# Patient Record
Sex: FEMALE | Race: WHITE | NOT HISPANIC OR LATINO | URBAN - METROPOLITAN AREA
[De-identification: names, ages, dates, MRNs, and addresses within clinical notes are randomized per-mention and may not be internally consistent; named-entity substitution may affect disease eponyms.]

---

## 2017-02-02 ENCOUNTER — HOSPITAL ENCOUNTER (EMERGENCY)
Facility: HOSPITAL | Age: 47
Discharge: HOME | End: 2017-02-02
Attending: EMERGENCY MEDICINE | Admitting: EMERGENCY MEDICINE
Payer: COMMERCIAL

## 2017-02-02 VITALS
WEIGHT: 200 LBS | BODY MASS INDEX: 31.39 KG/M2 | OXYGEN SATURATION: 96 % | HEIGHT: 67 IN | TEMPERATURE: 98.3 F | RESPIRATION RATE: 16 BRPM | HEART RATE: 94 BPM | DIASTOLIC BLOOD PRESSURE: 116 MMHG | SYSTOLIC BLOOD PRESSURE: 195 MMHG

## 2017-02-02 DIAGNOSIS — H11.439: Primary | ICD-10-CM

## 2017-02-02 PROCEDURE — 99282 EMERGENCY DEPT VISIT SF MDM: CPT

## 2017-02-02 RX ORDER — LISINOPRIL 10 MG/1
10 TABLET ORAL DAILY
COMMUNITY
End: 2019-10-08 | Stop reason: SDUPTHER

## 2017-02-02 RX ORDER — PROPARACAINE HYDROCHLORIDE 5 MG/ML
1 SOLUTION/ DROPS OPHTHALMIC ONCE
Status: COMPLETED | OUTPATIENT
Start: 2017-02-02 | End: 2017-02-02

## 2017-02-02 RX ADMIN — FLUORESCEIN SODIUM 1 STRIP: 1 STRIP OPHTHALMIC at 17:15

## 2017-02-02 RX ADMIN — PROPARACAINE HYDROCHLORIDE 1 DROP: 5 SOLUTION/ DROPS OPHTHALMIC at 17:15

## 2019-10-08 ENCOUNTER — OFFICE VISIT (OUTPATIENT)
Dept: FAMILY MEDICINE CLINIC | Facility: CLINIC | Age: 49
End: 2019-10-08
Payer: COMMERCIAL

## 2019-10-08 VITALS
OXYGEN SATURATION: 98 % | BODY MASS INDEX: 31.31 KG/M2 | DIASTOLIC BLOOD PRESSURE: 90 MMHG | HEIGHT: 67 IN | HEART RATE: 110 BPM | WEIGHT: 199.5 LBS | TEMPERATURE: 99.2 F | SYSTOLIC BLOOD PRESSURE: 150 MMHG

## 2019-10-08 DIAGNOSIS — R26.89 SHUFFLING GAIT: ICD-10-CM

## 2019-10-08 DIAGNOSIS — F41.9 ANXIETY: ICD-10-CM

## 2019-10-08 DIAGNOSIS — I10 ESSENTIAL HYPERTENSION: ICD-10-CM

## 2019-10-08 DIAGNOSIS — Z12.39 SCREENING FOR BREAST CANCER: ICD-10-CM

## 2019-10-08 DIAGNOSIS — E53.8 FOLIC ACID DEFICIENCY: ICD-10-CM

## 2019-10-08 DIAGNOSIS — R29.898 LEG WEAKNESS, BILATERAL: Primary | ICD-10-CM

## 2019-10-08 DIAGNOSIS — K59.09 CHRONIC CONSTIPATION: ICD-10-CM

## 2019-10-08 DIAGNOSIS — Z23 NEED FOR VACCINATION: ICD-10-CM

## 2019-10-08 PROCEDURE — 90715 TDAP VACCINE 7 YRS/> IM: CPT

## 2019-10-08 PROCEDURE — 99204 OFFICE O/P NEW MOD 45 MIN: CPT | Performed by: FAMILY MEDICINE

## 2019-10-08 PROCEDURE — 3008F BODY MASS INDEX DOCD: CPT | Performed by: FAMILY MEDICINE

## 2019-10-08 PROCEDURE — 90471 IMMUNIZATION ADMIN: CPT

## 2019-10-08 RX ORDER — LISINOPRIL 10 MG/1
10 TABLET ORAL DAILY
Qty: 30 TABLET | Refills: 2 | Status: SHIPPED | OUTPATIENT
Start: 2019-10-08

## 2019-10-08 RX ORDER — HYDROXYZINE HYDROCHLORIDE 25 MG/1
TABLET, FILM COATED ORAL
Qty: 30 TABLET | Refills: 0 | Status: SHIPPED | OUTPATIENT
Start: 2019-10-08 | End: 2019-11-26 | Stop reason: ALTCHOICE

## 2019-10-08 NOTE — PROGRESS NOTES
Subjective:   Chief Complaint   Patient presents with    Anxiety     pt here anxiety, pt states she is having troubl walking that started 2 years ago and has gotten worse, pt states sometimes it is very painfull that she can't even walk, pt states she had 2 MRI's done and they didn't show anything, Dr April Suero through [facility] ordered the MRI        Patient ID: Vandana Teran is a 52 y o  female  Pt here to re-establish as a patient  Complains of leg weakness and shuffling gait  Pt seen by neurologist, Dr Julio Cesar Lara and had Mri of brain - states was normal  Was done at [facility]  Complains of leg weakness for 2 years  - gradually progressive  No studies done of low back  Some low back pain  Complains of anxiety  Tried buspar for anxiety but made anxiety worse  Ssri not helpful  Has not seen psychiatrist or therapist    Last blood work, was 3 months ago  Dr Julio Cesar Lara neurologist - has follow up in November  Has bp elevation  Taking 2 5mg lisinopril 2 tabs daily  Tolerating medication, denies chest pain or shortness of breath  Complains of trouble moving bowels, constipation, tried sons metamucil  No blood, no dark stools  The following portions of the patient's history were reviewed and updated as appropriate: allergies, current medications, past family history, past medical history, past social history, past surgical history and problem list     Review of Systems   Constitutional: Negative  Negative for fatigue and fever  HENT: Negative  Eyes: Negative  Respiratory: Negative  Negative for cough  Cardiovascular: Negative  Gastrointestinal: Negative  Endocrine: Negative  Genitourinary: Negative  Musculoskeletal: Positive for gait problem  Skin: Negative  Allergic/Immunologic: Negative  Neurological: Positive for weakness  Negative for tremors and numbness  Psychiatric/Behavioral: Negative for dysphoric mood  The patient is nervous/anxious  Objective:  Vitals:    10/08/19 1419 10/08/19 1452   BP: (!) 166/108 150/90   Pulse: (!) 110    Temp: 99 2 °F (37 3 °C)    SpO2: 98%    Weight: 90 5 kg (199 lb 8 oz)    Height: 5' 6 5" (1 689 m)       Physical Exam   Constitutional: She is oriented to person, place, and time  She appears well-developed and well-nourished  HENT:   Head: Normocephalic and atraumatic  Cardiovascular: Normal rate, regular rhythm, normal heart sounds and intact distal pulses  Pulmonary/Chest: Effort normal and breath sounds normal    Abdominal: Soft  Bowel sounds are normal    Musculoskeletal: She exhibits no edema, tenderness or deformity  Good strength, bilateral lower extremities  Minimal central pain over lumbar spine  Neurological: She is alert and oriented to person, place, and time  She displays normal reflexes  No sensory deficit  She exhibits normal muscle tone  Coordination abnormal    Cannot walk on heels or toes   Skin: Skin is warm and dry  Psychiatric: She has a normal mood and affect  Her behavior is normal  Judgment and thought content normal    Nursing note and vitals reviewed  Diabetic Foot Exam      Assessment/Plan:    No problem-specific Assessment & Plan notes found for this encounter  Diagnoses and all orders for this visit:    Leg weakness, bilateral  Comments:  recommend mri lumbar spine without contrast r/o spinal stenosis, cauda equina  Orders:  -     MRI lumbar spine wo contrast; Future  -     EMG 2 limb lower extremity;  Future    Shuffling gait  Comments:  continue follow up with Dr Yokasta White, may need physical therapy    Folic acid deficiency  Comments:  continue folic acid supplement daily     Anxiety  Comments:  trial hydroxyzine, consider lamictal, would benefit from psychiatric evaluation  Orders:  -     hydrOXYzine HCL (ATARAX) 25 mg tablet; 1/2-1 tab 3 times a day as needed for anxiety and 1-2 at bedtime    Essential hypertension  Comments:  uncontrolled, increase lisinopril to 10mg daily   Orders:  -     lisinopril (ZESTRIL) 10 mg tablet; Take 1 tablet (10 mg total) by mouth daily    Screening for breast cancer  Comments:  schedule mammogram  Orders:  -     Mammo screening bilateral w 3d & cad; Future    Need for vaccination  Comments: Tdap given today  Orders:  -     TDAP VACCINE GREATER THAN OR EQUAL TO 8YO IM    BMI 31 0-31 9,adult  Comments:  see bmi plan    Chronic constipation  Comments:  trial benefiber, increase fluids, miralax    BMI 31 0-31 9,adult    Other orders  -     FOLIC ACID PO; Take by mouth        BMI Counseling: Body mass index is 31 72 kg/m²  The BMI is above normal  Nutrition recommendations include reducing portion sizes and 3-5 servings of fruits/vegetables daily  Exercise recommendations include exercising 3-5 times per week  BMI Counseling: Body mass index is 31 72 kg/m²  The BMI is above normal  Nutrition recommendations include reducing portion sizes and 3-5 servings of fruits/vegetables daily  Exercise recommendations include exercising 3-5 times per week  BMI Counseling: Body mass index is 31 72 kg/m²  The BMI is above normal  Nutrition recommendations include reducing portion sizes and 3-5 servings of fruits/vegetables daily  Exercise recommendations include exercising 3-5 times per week

## 2019-10-08 NOTE — PATIENT INSTRUCTIONS
Tdap given today  Continue folate supplement  Mri lumbar spine without contrast  Consider emg bilateral lower extremities   benefiber  1 cap in liquid daily= fiber  Then can add miralax 1 cap in liquid daily as needed , safe to take daily if needed  Hydroxyzine for anxiety 1/2-1 tab 3 times a day and 1-2 tabs at bedtime as needed for anxiety  Lisinopril 10mg daily bp 120-130/70-80  Obesity   AMBULATORY CARE:   Obesity  is when your body mass index (BMI) is greater than 30  Your healthcare provider will use your height and weight to measure your BMI  The risks of obesity include  many health problems, such as injuries or physical disability  You may need tests to check for the following:  · Diabetes     · High blood pressure or high cholesterol     · Heart disease     · Gallbladder or liver disease     · Cancer of the colon, breast, prostate, liver, or kidney     · Sleep apnea     · Arthritis or gout  Seek care immediately if:   · You have a severe headache, confusion, or difficulty speaking  · You have weakness on one side of your body  · You have chest pain, sweating, or shortness of breath  Contact your healthcare provider if:   · You have symptoms of gallbladder or liver disease, such as pain in your upper abdomen  · You have knee or hip pain and discomfort while walking  · You have symptoms of diabetes, such as intense hunger and thirst, and frequent urination  · You have symptoms of sleep apnea, such as snoring or daytime sleepiness  · You have questions or concerns about your condition or care  Treatment for obesity  focuses on helping you lose weight to improve your health  Even a small decrease in BMI can reduce the risk for many health problems  Your healthcare provider will help you set a weight-loss goal   · Lifestyle changes  are the first step in treating obesity  These include making healthy food choices and getting regular physical activity   Your healthcare provider may suggest a weight-loss program that involves coaching, education, and therapy  · Medicine  may help you lose weight when it is used with a healthy diet and physical activity  · Surgery  can help you lose weight if you are very obese and have other health problems  There are several types of weight-loss surgery  Ask your healthcare provider for more information  Be successful losing weight:   · Set small, realistic goals  An example of a small goal is to walk for 20 minutes 5 days a week  Anther goal is to lose 5% of your body weight  · Tell friends, family members, and coworkers about your goals  and ask for their support  Ask a friend to lose weight with you, or join a weight-loss support group  · Identify foods or triggers that may cause you to overeat , and find ways to avoid them  Remove tempting high-calorie foods from your home and workplace  Place a bowl of fresh fruit on your kitchen counter  If stress causes you to eat, then find other ways to cope with stress  · Keep a diary to track what you eat and drink  Also write down how many minutes of physical activity you do each day  Weigh yourself once a week and record it in your diary  Eating changes: You will need to eat 500 to 1,000 fewer calories each day than you currently eat to lose 1 to 2 pounds a week  The following changes will help you cut calories:  · Eat smaller portions  Use small plates, no larger than 9 inches in diameter  Fill your plate half full of fruits and vegetables  Measure your food using measuring cups until you know what a serving size looks like  · Eat 3 meals and 1 or 2 snacks each day  Plan your meals in advance  Kay Schmid and eat at home most of the time  Eat slowly  · Eat fruits and vegetables at every meal   They are low in calories and high in fiber, which makes you feel full  Do not add butter, margarine, or cream sauce to vegetables  Use herbs to season steamed vegetables       · Eat less fat and fewer fried foods  Eat more baked or grilled chicken and fish  These protein sources are lower in calories and fat than red meat  Limit fast food  Dress your salads with olive oil and vinegar instead of bottled dressing  · Limit the amount of sugar you eat  Do not drink sugary beverages  Limit alcohol  Activity changes:  Physical activity is good for your body in many ways  It helps you burn calories and build strong muscles  It decreases stress and depression, and improves your mood  It can also help you sleep better  Talk to your healthcare provider before you begin an exercise program   · Exercise for at least 30 minutes 5 days a week  Start slowly  Set aside time each day for physical activity that you enjoy and that is convenient for you  It is best to do both weight training and an activity that increases your heart rate, such as walking, bicycling, or swimming  · Find ways to be more active  Do yard work and housecleaning  Walk up the stairs instead of using elevators  Spend your leisure time going to events that require walking, such as outdoor festivals or fairs  This extra physical activity can help you lose weight and keep it off  Follow up with your healthcare provider as directed: You may need to meet with a dietitian  Write down your questions so you remember to ask them during your visits  © 2017 [address] Information is for End User's use only and may not be sold, redistributed or otherwise used for commercial purposes  All illustrations and images included in CareNotes® are the copyrighted property of [employer]  or Terry Zamudio  The above information is an  only  It is not intended as medical advice for individual conditions or treatments  Talk to your doctor, nurse or pharmacist before following any medical regimen to see if it is safe and effective for you      Obesity   AMBULATORY CARE:   Obesity  is when your body mass index (BMI) is greater than 30  Your healthcare provider will use your height and weight to measure your BMI  The risks of obesity include  many health problems, such as injuries or physical disability  You may need tests to check for the following:  · Diabetes     · High blood pressure or high cholesterol     · Heart disease     · Gallbladder or liver disease     · Cancer of the colon, breast, prostate, liver, or kidney     · Sleep apnea     · Arthritis or gout  Seek care immediately if:   · You have a severe headache, confusion, or difficulty speaking  · You have weakness on one side of your body  · You have chest pain, sweating, or shortness of breath  Contact your healthcare provider if:   · You have symptoms of gallbladder or liver disease, such as pain in your upper abdomen  · You have knee or hip pain and discomfort while walking  · You have symptoms of diabetes, such as intense hunger and thirst, and frequent urination  · You have symptoms of sleep apnea, such as snoring or daytime sleepiness  · You have questions or concerns about your condition or care  Treatment for obesity  focuses on helping you lose weight to improve your health  Even a small decrease in BMI can reduce the risk for many health problems  Your healthcare provider will help you set a weight-loss goal   · Lifestyle changes  are the first step in treating obesity  These include making healthy food choices and getting regular physical activity  Your healthcare provider may suggest a weight-loss program that involves coaching, education, and therapy  · Medicine  may help you lose weight when it is used with a healthy diet and physical activity  · Surgery  can help you lose weight if you are very obese and have other health problems  There are several types of weight-loss surgery  Ask your healthcare provider for more information  Be successful losing weight:   · Set small, realistic goals    An example of a small goal is to walk for 20 minutes 5 days a week  Harish goal is to lose 5% of your body weight  · Tell friends, family members, and coworkers about your goals  and ask for their support  Ask a friend to lose weight with you, or join a weight-loss support group  · Identify foods or triggers that may cause you to overeat , and find ways to avoid them  Remove tempting high-calorie foods from your home and workplace  Place a bowl of fresh fruit on your kitchen counter  If stress causes you to eat, then find other ways to cope with stress  · Keep a diary to track what you eat and drink  Also write down how many minutes of physical activity you do each day  Weigh yourself once a week and record it in your diary  Eating changes: You will need to eat 500 to 1,000 fewer calories each day than you currently eat to lose 1 to 2 pounds a week  The following changes will help you cut calories:  · Eat smaller portions  Use small plates, no larger than 9 inches in diameter  Fill your plate half full of fruits and vegetables  Measure your food using measuring cups until you know what a serving size looks like  · Eat 3 meals and 1 or 2 snacks each day  Plan your meals in advance  [place] and eat at home most of the time  Eat slowly  · Eat fruits and vegetables at every meal   They are low in calories and high in fiber, which makes you feel full  Do not add butter, margarine, or cream sauce to vegetables  Use herbs to season steamed vegetables  · Eat less fat and fewer fried foods  Eat more baked or grilled chicken and fish  These protein sources are lower in calories and fat than red meat  Limit fast food  Dress your salads with olive oil and vinegar instead of bottled dressing  · Limit the amount of sugar you eat  Do not drink sugary beverages  Limit alcohol  Activity changes:  Physical activity is good for your body in many ways  It helps you burn calories and build strong muscles   It decreases stress and depression, and improves your mood  It can also help you sleep better  Talk to your healthcare provider before you begin an exercise program   · Exercise for at least 30 minutes 5 days a week  Start slowly  Set aside time each day for physical activity that you enjoy and that is convenient for you  It is best to do both weight training and an activity that increases your heart rate, such as walking, bicycling, or swimming  · Find ways to be more active  Do yard work and housecleaning  Walk up the stairs instead of using elevators  Spend your leisure time going to events that require walking, such as outdoor festivals or fairs  This extra physical activity can help you lose weight and keep it off  Follow up with your healthcare provider as directed: You may need to meet with a dietitian  Write down your questions so you remember to ask them during your visits  © 2017 [address] Information is for End User's use only and may not be sold, redistributed or otherwise used for commercial purposes  All illustrations and images included in CareNotes® are the copyrighted property of A [employer] M [employer] Inc  or Terry Zamudio  The above information is an  only  It is not intended as medical advice for individual conditions or treatments  Talk to your doctor, nurse or pharmacist before following any medical regimen to see if it is safe and effective for you

## 2019-10-09 ENCOUNTER — TELEPHONE (OUTPATIENT)
Dept: FAMILY MEDICINE CLINIC | Facility: CLINIC | Age: 49
End: 2019-10-09

## 2019-10-09 PROBLEM — K59.09 CHRONIC CONSTIPATION: Status: ACTIVE | Noted: 2019-10-09

## 2019-10-09 PROBLEM — Z23 NEED FOR VACCINATION: Status: ACTIVE | Noted: 2019-10-09

## 2019-10-09 PROBLEM — I10 ESSENTIAL HYPERTENSION: Status: ACTIVE | Noted: 2019-10-09

## 2019-10-09 PROBLEM — E53.8 FOLIC ACID DEFICIENCY: Status: ACTIVE | Noted: 2019-10-09

## 2019-10-09 PROBLEM — F41.9 ANXIETY: Status: ACTIVE | Noted: 2019-10-09

## 2019-10-09 PROBLEM — R26.89 SHUFFLING GAIT: Status: ACTIVE | Noted: 2019-10-09

## 2019-10-09 PROBLEM — Z12.39 SCREENING FOR BREAST CANCER: Status: ACTIVE | Noted: 2019-10-09

## 2019-10-15 ENCOUNTER — APPOINTMENT (EMERGENCY)
Dept: RADIOLOGY | Facility: HOSPITAL | Age: 49
End: 2019-10-15
Payer: COMMERCIAL

## 2019-10-15 ENCOUNTER — HOSPITAL ENCOUNTER (EMERGENCY)
Facility: HOSPITAL | Age: 49
Discharge: HOME | End: 2019-10-15
Attending: EMERGENCY MEDICINE | Admitting: EMERGENCY MEDICINE
Payer: COMMERCIAL

## 2019-10-15 VITALS
TEMPERATURE: 98 F | BODY MASS INDEX: 32.06 KG/M2 | RESPIRATION RATE: 19 BRPM | WEIGHT: 199.52 LBS | HEART RATE: 88 BPM | SYSTOLIC BLOOD PRESSURE: 133 MMHG | OXYGEN SATURATION: 98 % | HEIGHT: 66 IN | DIASTOLIC BLOOD PRESSURE: 76 MMHG

## 2019-10-15 DIAGNOSIS — R07.89 ATYPICAL CHEST PAIN: ICD-10-CM

## 2019-10-15 DIAGNOSIS — F10.10 ALCOHOL ABUSE: ICD-10-CM

## 2019-10-15 DIAGNOSIS — E87.6 ACUTE HYPOKALEMIA: ICD-10-CM

## 2019-10-15 DIAGNOSIS — F10.929 ALCOHOL INTOXICATION (HCC): Primary | ICD-10-CM

## 2019-10-15 LAB
ALBUMIN SERPL BCP-MCNC: 3.3 G/DL (ref 3.5–5)
ALP SERPL-CCNC: 203 U/L (ref 46–116)
ALT SERPL W P-5'-P-CCNC: 89 U/L (ref 12–78)
AMPHETAMINES SERPL QL SCN: NEGATIVE
ANION GAP SERPL CALCULATED.3IONS-SCNC: 16 MMOL/L (ref 4–13)
ANION GAP SERPL CALCULATED.3IONS-SCNC: 20 MMOL/L (ref 4–13)
AST SERPL W P-5'-P-CCNC: 311 U/L (ref 5–45)
BARBITURATES UR QL: NEGATIVE
BASOPHILS # BLD AUTO: 0.05 THOUSANDS/ΜL (ref 0–0.1)
BASOPHILS NFR BLD AUTO: 1 % (ref 0–1)
BENZODIAZ UR QL: NEGATIVE
BILIRUB SERPL-MCNC: 1 MG/DL (ref 0.2–1)
BUN SERPL-MCNC: 10 MG/DL (ref 5–25)
BUN SERPL-MCNC: 8 MG/DL (ref 5–25)
CA-I BLD-SCNC: 0.92 MMOL/L (ref 1.12–1.32)
CALCIUM SERPL-MCNC: 7.4 MG/DL (ref 8.3–10.1)
CALCIUM SERPL-MCNC: 7.6 MG/DL (ref 8.3–10.1)
CHLORIDE SERPL-SCNC: 100 MMOL/L (ref 100–108)
CHLORIDE SERPL-SCNC: 98 MMOL/L (ref 100–108)
CO2 SERPL-SCNC: 24 MMOL/L (ref 21–32)
CO2 SERPL-SCNC: 26 MMOL/L (ref 21–32)
COCAINE UR QL: NEGATIVE
CREAT SERPL-MCNC: 0.55 MG/DL (ref 0.6–1.3)
CREAT SERPL-MCNC: 0.62 MG/DL (ref 0.6–1.3)
EOSINOPHIL # BLD AUTO: 0.03 THOUSAND/ΜL (ref 0–0.61)
EOSINOPHIL NFR BLD AUTO: 1 % (ref 0–6)
ERYTHROCYTE [DISTWIDTH] IN BLOOD BY AUTOMATED COUNT: 14.5 % (ref 11.6–15.1)
ETHANOL SERPL-MCNC: 461 MG/DL (ref 0–3)
GFR SERPL CREATININE-BSD FRML MDRD: 106 ML/MIN/1.73SQ M
GFR SERPL CREATININE-BSD FRML MDRD: 110 ML/MIN/1.73SQ M
GLUCOSE SERPL-MCNC: 126 MG/DL (ref 65–140)
GLUCOSE SERPL-MCNC: 73 MG/DL (ref 65–140)
HCT VFR BLD AUTO: 38.2 % (ref 34.8–46.1)
HGB BLD-MCNC: 12.9 G/DL (ref 11.5–15.4)
IMM GRANULOCYTES # BLD AUTO: 0.06 THOUSAND/UL (ref 0–0.2)
IMM GRANULOCYTES NFR BLD AUTO: 1 % (ref 0–2)
LYMPHOCYTES # BLD AUTO: 1.61 THOUSANDS/ΜL (ref 0.6–4.47)
LYMPHOCYTES NFR BLD AUTO: 31 % (ref 14–44)
MAGNESIUM SERPL-MCNC: 1.9 MG/DL (ref 1.6–2.6)
MCH RBC QN AUTO: 32.7 PG (ref 26.8–34.3)
MCHC RBC AUTO-ENTMCNC: 33.8 G/DL (ref 31.4–37.4)
MCV RBC AUTO: 97 FL (ref 82–98)
METHADONE UR QL: NEGATIVE
MONOCYTES # BLD AUTO: 0.62 THOUSAND/ΜL (ref 0.17–1.22)
MONOCYTES NFR BLD AUTO: 12 % (ref 4–12)
NEUTROPHILS # BLD AUTO: 2.75 THOUSANDS/ΜL (ref 1.85–7.62)
NEUTS SEG NFR BLD AUTO: 54 % (ref 43–75)
NRBC BLD AUTO-RTO: 0 /100 WBCS
OPIATES UR QL SCN: NEGATIVE
PCP UR QL: NEGATIVE
PLATELET # BLD AUTO: 157 THOUSANDS/UL (ref 149–390)
PMV BLD AUTO: 11.1 FL (ref 8.9–12.7)
POTASSIUM SERPL-SCNC: 3 MMOL/L (ref 3.5–5.3)
POTASSIUM SERPL-SCNC: 3.2 MMOL/L (ref 3.5–5.3)
PROT SERPL-MCNC: 6.9 G/DL (ref 6.4–8.2)
RBC # BLD AUTO: 3.95 MILLION/UL (ref 3.81–5.12)
SODIUM SERPL-SCNC: 140 MMOL/L (ref 136–145)
SODIUM SERPL-SCNC: 144 MMOL/L (ref 136–145)
THC UR QL: NEGATIVE
TROPONIN I SERPL-MCNC: <0.02 NG/ML
TROPONIN I SERPL-MCNC: <0.02 NG/ML
WBC # BLD AUTO: 5.12 THOUSAND/UL (ref 4.31–10.16)

## 2019-10-15 PROCEDURE — 99284 EMERGENCY DEPT VISIT MOD MDM: CPT

## 2019-10-15 PROCEDURE — 82330 ASSAY OF CALCIUM: CPT | Performed by: EMERGENCY MEDICINE

## 2019-10-15 PROCEDURE — 80307 DRUG TEST PRSMV CHEM ANLYZR: CPT | Performed by: EMERGENCY MEDICINE

## 2019-10-15 PROCEDURE — 96361 HYDRATE IV INFUSION ADD-ON: CPT

## 2019-10-15 PROCEDURE — 85025 COMPLETE CBC W/AUTO DIFF WBC: CPT | Performed by: EMERGENCY MEDICINE

## 2019-10-15 PROCEDURE — 71045 X-RAY EXAM CHEST 1 VIEW: CPT

## 2019-10-15 PROCEDURE — 36415 COLL VENOUS BLD VENIPUNCTURE: CPT | Performed by: EMERGENCY MEDICINE

## 2019-10-15 PROCEDURE — 80320 DRUG SCREEN QUANTALCOHOLS: CPT | Performed by: EMERGENCY MEDICINE

## 2019-10-15 PROCEDURE — 96366 THER/PROPH/DIAG IV INF ADDON: CPT

## 2019-10-15 PROCEDURE — 93005 ELECTROCARDIOGRAM TRACING: CPT

## 2019-10-15 PROCEDURE — 96365 THER/PROPH/DIAG IV INF INIT: CPT

## 2019-10-15 PROCEDURE — 99285 EMERGENCY DEPT VISIT HI MDM: CPT | Performed by: EMERGENCY MEDICINE

## 2019-10-15 PROCEDURE — 83735 ASSAY OF MAGNESIUM: CPT | Performed by: EMERGENCY MEDICINE

## 2019-10-15 PROCEDURE — 80053 COMPREHEN METABOLIC PANEL: CPT | Performed by: EMERGENCY MEDICINE

## 2019-10-15 PROCEDURE — 84484 ASSAY OF TROPONIN QUANT: CPT | Performed by: EMERGENCY MEDICINE

## 2019-10-15 PROCEDURE — 80048 BASIC METABOLIC PNL TOTAL CA: CPT | Performed by: EMERGENCY MEDICINE

## 2019-10-15 RX ORDER — POTASSIUM CHLORIDE 20 MEQ/1
20 TABLET, EXTENDED RELEASE ORAL ONCE
Status: COMPLETED | OUTPATIENT
Start: 2019-10-15 | End: 2019-10-15

## 2019-10-15 RX ORDER — CALCIUM CARBONATE 200(500)MG
500 TABLET,CHEWABLE ORAL DAILY PRN
Status: DISCONTINUED | OUTPATIENT
Start: 2019-10-15 | End: 2019-10-16 | Stop reason: HOSPADM

## 2019-10-15 RX ORDER — 0.9 % SODIUM CHLORIDE 0.9 %
3 VIAL (ML) INJECTION AS NEEDED
Status: DISCONTINUED | OUTPATIENT
Start: 2019-10-15 | End: 2019-10-16 | Stop reason: HOSPADM

## 2019-10-15 RX ORDER — POTASSIUM CHLORIDE 14.9 MG/ML
20 INJECTION INTRAVENOUS ONCE
Status: COMPLETED | OUTPATIENT
Start: 2019-10-15 | End: 2019-10-15

## 2019-10-15 RX ORDER — FOLIC ACID 1 MG/1
1 TABLET ORAL ONCE
Status: COMPLETED | OUTPATIENT
Start: 2019-10-15 | End: 2019-10-15

## 2019-10-15 RX ORDER — THIAMINE MONONITRATE (VIT B1) 100 MG
100 TABLET ORAL ONCE
Status: COMPLETED | OUTPATIENT
Start: 2019-10-15 | End: 2019-10-15

## 2019-10-15 RX ADMIN — FOLIC ACID 1 MG: 1 TABLET ORAL at 13:47

## 2019-10-15 RX ADMIN — POTASSIUM CHLORIDE 20 MEQ: 1500 TABLET, EXTENDED RELEASE ORAL at 14:39

## 2019-10-15 RX ADMIN — THIAMINE HCL TAB 100 MG 100 MG: 100 TAB at 13:47

## 2019-10-15 RX ADMIN — POTASSIUM CHLORIDE 20 MEQ: 14.9 INJECTION, SOLUTION INTRAVENOUS at 14:39

## 2019-10-15 RX ADMIN — SODIUM CHLORIDE 1000 ML: 0.9 INJECTION, SOLUTION INTRAVENOUS at 13:35

## 2019-10-15 NOTE — ED NOTES
Dr Benitez Rater spoke with [facility] ref referral      Helder Abebe, RN  10/15/19 [phone]

## 2019-10-15 NOTE — ED NOTES
Ambulated to [facility] with walker, slow but steady gait, provider at bedside and witnessed     Ketan Johnson RN  10/15/19 2835

## 2019-10-16 LAB
ATRIAL RATE: 69 BPM
ATRIAL RATE: 75 BPM
ATRIAL RATE: 88 BPM
P AXIS: 65 DEGREES
P AXIS: 68 DEGREES
P AXIS: 80 DEGREES
PR INTERVAL: 136 MS
PR INTERVAL: 136 MS
PR INTERVAL: 144 MS
QRS AXIS: 1 DEGREES
QRS AXIS: 35 DEGREES
QRS AXIS: 38 DEGREES
QRSD INTERVAL: 102 MS
QRSD INTERVAL: 86 MS
QRSD INTERVAL: 92 MS
QT INTERVAL: 400 MS
QT INTERVAL: 438 MS
QT INTERVAL: 486 MS
QTC INTERVAL: 484 MS
QTC INTERVAL: 489 MS
QTC INTERVAL: 520 MS
T WAVE AXIS: 136 DEGREES
T WAVE AXIS: 77 DEGREES
T WAVE AXIS: 83 DEGREES
VENTRICULAR RATE: 69 BPM
VENTRICULAR RATE: 75 BPM
VENTRICULAR RATE: 88 BPM

## 2019-10-16 PROCEDURE — 93010 ELECTROCARDIOGRAM REPORT: CPT | Performed by: INTERNAL MEDICINE

## 2019-10-16 NOTE — ED CARE HANDOFF
Emergency Department Sign Out Note        Sign out and transfer of care from Dr Vini Gutiérrez  See Separate Emergency Department note  The patient, Marj Chester, was evaluated by the previous provider for ETOH intoxication  Workup Completed:  Labs Reviewed   COMPREHENSIVE METABOLIC PANEL - Abnormal       Result Value Ref Range Status    Sodium 144  136 - 145 mmol/L Final    Potassium 3 0 (*) 3 5 - 5 3 mmol/L Final    Chloride 98 (*) 100 - 108 mmol/L Final    CO2 26  21 - 32 mmol/L Final    ANION GAP 20 (*) 4 - 13 mmol/L Final    BUN 10  5 - 25 mg/dL Final    Creatinine 0 62  0 60 - 1 30 mg/dL Final    Comment: Standardized to IDMS reference method    Glucose 73  65 - 140 mg/dL Final    Comment:   If the patient is fasting, the ADA then defines impaired fasting glucose as > 100 mg/dL and diabetes as > or equal to 123 mg/dL  Specimen collection should occur prior to Sulfasalazine administration due to the potential for falsely depressed results  Specimen collection should occur prior to Sulfapyridine administration due to the potential for falsely elevated results  Calcium 7 6 (*) 8 3 - 10 1 mg/dL Final     (*) 5 - 45 U/L Final    Comment:   Specimen collection should occur prior to Sulfasalazine administration due to the potential for falsely depressed results  ALT 89 (*) 12 - 78 U/L Final    Comment:   Specimen collection should occur prior to Sulfasalazine administration due to the potential for falsely depressed results       Alkaline Phosphatase 203 (*) 46 - 116 U/L Final    Total Protein 6 9  6 4 - 8 2 g/dL Final    Albumin 3 3 (*) 3 5 - 5 0 g/dL Final    Total Bilirubin 1 00  0 20 - 1 00 mg/dL Final    eGFR 106  ml/min/1 73sq m Final    Narrative:     [place] guidelines for Chronic Kidney Disease (CKD):     Stage 1 with normal or high GFR (GFR > 90 mL/min/1 73 square meters)    Stage 2 Mild CKD (GFR = 60-89 mL/min/1 73 square meters)    Stage 3A Moderate CKD (GFR = 45-59 mL/min/1 73 square meters)    Stage 3B Moderate CKD (GFR = 30-44 mL/min/1 73 square meters)    Stage 4 Severe CKD (GFR = 15-29 mL/min/1 73 square meters)    Stage 5 End Stage CKD (GFR <15 mL/min/1 73 square meters)  Note: GFR calculation is accurate only with a steady state creatinine   MEDICAL ALCOHOL - Abnormal    Ethanol Lvl 461 (*) 0 - 3 mg/dL Final    Comment: Verified by Repeat Analysis   CALCIUM, IONIZED - Abnormal    Calcium, Ionized 0 92 (*) 1 12 - 1 32 mmol/L Final   BASIC METABOLIC PANEL - Abnormal    Sodium 140  136 - 145 mmol/L Final    Potassium 3 2 (*) 3 5 - 5 3 mmol/L Final    Chloride 100  100 - 108 mmol/L Final    CO2 24  21 - 32 mmol/L Final    ANION GAP 16 (*) 4 - 13 mmol/L Final    BUN 8  5 - 25 mg/dL Final    Creatinine 0 55 (*) 0 60 - 1 30 mg/dL Final    Comment: Standardized to IDMS reference method    Glucose 126  65 - 140 mg/dL Final    Comment:   If the patient is fasting, the ADA then defines impaired fasting glucose as > 100 mg/dL and diabetes as > or equal to 123 mg/dL  Specimen collection should occur prior to Sulfasalazine administration due to the potential for falsely depressed results  Specimen collection should occur prior to Sulfapyridine administration due to the potential for falsely elevated results      Calcium 7 4 (*) 8 3 - 10 1 mg/dL Final    eGFR 110  ml/min/1 73sq m Final    Narrative:     [place] guidelines for Chronic Kidney Disease (CKD):     Stage 1 with normal or high GFR (GFR > 90 mL/min/1 73 square meters)    Stage 2 Mild CKD (GFR = 60-89 mL/min/1 73 square meters)    Stage 3A Moderate CKD (GFR = 45-59 mL/min/1 73 square meters)    Stage 3B Moderate CKD (GFR = 30-44 mL/min/1 73 square meters)    Stage 4 Severe CKD (GFR = 15-29 mL/min/1 73 square meters)    Stage 5 End Stage CKD (GFR <15 mL/min/1 73 square meters)  Note: GFR calculation is accurate only with a steady state creatinine   TROPONIN I - Normal    Troponin I <0 02  <=0 04 ng/mL Final    Comment: 3Autovalidation override  [employer] Chemistry analyzer 99% cutoff is > 0 04 ng/mL in network labs     o cTnI 99% cutoff is useful only when applied to patients in the clinical setting of myocardial ischemia   o cTnI 99% cutoff should be interpreted in the context of clinical history, ECG findings and possibly cardiac imaging to establish correct diagnosis  o cTnI 99% cutoff may be suggestive but clearly not indicative of a coronary event without the clinical setting of myocardial ischemia  MAGNESIUM - Normal    Magnesium 1 9  1 6 - 2 6 mg/dL Final   RAPID DRUG SCREEN, URINE - Normal    Amph/Meth UR Negative  Negative Final    Barbiturate Ur Negative  Negative Final    Benzodiazepine Urine Negative  Negative Final    Cocaine Urine Negative  Negative Final    Methadone Urine Negative  Negative Final    Opiate Urine Negative  Negative Final    PCP Ur Negative  Negative Final    THC Urine Negative  Negative Final    Narrative:     FOR MEDICAL PURPOSES ONLY  IF CONFIRMATION NEEDED PLEASE CONTACT THE LAB WITHIN 5 DAYS  Drug Screen Cutoff Levels:  AMPHETAMINE/METHAMPHETAMINES  1000 ng/mL  BARBITURATES     200 ng/mL  BENZODIAZEPINES     200 ng/mL  COCAINE      300 ng/mL  METHADONE      300 ng/mL  OPIATES      300 ng/mL  PHENCYCLIDINE     25 ng/mL  THC       50 ng/mL     TROPONIN I - Normal    Troponin I <0 02  <=0 04 ng/mL Final    Comment: 3Autovalidation override  S[employer]ens Chemistry analyzer 99% cutoff is > 0 04 ng/mL in network labs     o cTnI 99% cutoff is useful only when applied to patients in the clinical setting of myocardial ischemia   o cTnI 99% cutoff should be interpreted in the context of clinical history, ECG findings and possibly cardiac imaging to establish correct diagnosis  o cTnI 99% cutoff may be suggestive but clearly not indicative of a coronary event without the clinical setting of myocardial ischemia       CBC AND DIFFERENTIAL    WBC 5 12  4 31 - 10 16 Thousand/uL Final    RBC 3 95  3 81 - 5 12 Million/uL Final    Hemoglobin 12 9  11 5 - 15 4 g/dL Final    Hematocrit 38 2  34 8 - 46 1 % Final    MCV 97  82 - 98 fL Final    MCH 32 7  26 8 - 34 3 pg Final    MCHC 33 8  31 4 - 37 4 g/dL Final    RDW 14 5  11 6 - 15 1 % Final    MPV 11 1  8 9 - 12 7 fL Final    Platelets 627  859 - 390 Thousands/uL Final    nRBC 0  /100 WBCs Final    Neutrophils Relative 54  43 - 75 % Final    Immat GRANS % 1  0 - 2 % Final    Lymphocytes Relative 31  14 - 44 % Final    Monocytes Relative 12  4 - 12 % Final    Eosinophils Relative 1  0 - 6 % Final    Basophils Relative 1  0 - 1 % Final    Neutrophils Absolute 2 75  1 85 - 7 62 Thousands/µL Final    Immature Grans Absolute 0 06  0 00 - 0 20 Thousand/uL Final    Lymphocytes Absolute 1 61  0 60 - 4 47 Thousands/µL Final    Monocytes Absolute 0 62  0 17 - 1 22 Thousand/µL Final    Eosinophils Absolute 0 03  0 00 - 0 61 Thousand/µL Final    Basophils Absolute 0 05  0 00 - 0 10 Thousands/µL Final   MEDICAL ALCOHOL      X-ray chest 1 view portable   ED Interpretation   No acute abnl      Final Result      No acute cardiopulmonary disease  Workstation performed: [id number]              ED Course / Workup Pending (followup):  Pt signed out by Dr Coby Yuan  No SI/HI  Awaiting placement for ETOH detox  Medically cleared per Dr Coby Yuan        HEART Risk Score      Most Recent Value   History  0 Filed at: 10/15/2019 1332   ECG  0 Filed at: 10/15/2019 1332   Age  1 Filed at: 10/15/2019 1332   Risk Factors  2 Filed at: 10/15/2019 1332   Troponin  0 Filed at: 10/15/2019 1332   Heart Score Risk Calculator   History  0 Filed at: 10/15/2019 1332   ECG  0 Filed at: 10/15/2019 1332   Age  1 Filed at: 10/15/2019 1332   Risk Factors  2 Filed at: 10/15/2019 1332   Troponin  0 Filed at: 10/15/2019 1332   HEART Score  3 Filed at: 10/15/2019 1332   HEART Score  3 Filed at: 10/15/2019 1332                          ED Course as of Oct 15 2306   Tue Oct 15, 2019 2042 Pt has bed placement  2256 There is an issue with insurance, pt has to f/u with [facility] tmrw  There is a bed available for her        2305 Pt is going home with sister, who will stay with her  Understands no driving due to intoxication  Procedures  MDM  Number of Diagnoses or Management Options  Acute hypokalemia: new and requires workup  Alcohol abuse: established and worsening  Alcohol intoxication ([facility] [address] ): established and worsening  Atypical chest pain: new and requires workup     Amount and/or Complexity of Data Reviewed  Clinical lab tests: reviewed  Tests in the radiology section of CPT®: reviewed  Tests in the medicine section of CPT®: reviewed  Discuss the patient with other providers: yes    Risk of Complications, Morbidity, and/or Mortality  Presenting problems: moderate  Diagnostic procedures: low  Management options: low    Patient Progress  Patient progress: stable      Disposition  Final diagnoses:   Alcohol intoxication ([facility] [address] )   Alcohol abuse   Acute hypokalemia   Atypical chest pain     Time reflects when diagnosis was documented in both MDM as applicable and the Disposition within this note     Time User Action Codes Description Comment    10/15/2019  7:28 PM Prashant Quick Add [F10 929] Alcohol intoxication ([facility] [address] )     10/15/2019  7:28 PM Prashant Quick Add [F10 10] Alcohol abuse     10/15/2019  7:28 PM Prashant Quick Add [E87 6] Acute hypokalemia     10/15/2019  7:28 PM Prashant Quick Add [R07 89] Atypical chest pain       ED Disposition     ED Disposition Condition Date/Time Comment    Discharge Stable Tue Oct 15, 2019 10:56 PM Ninoska Spear discharge to home/self care              Follow-up Information     Follow up With Specialties Details Why Contact Info Additional Information    Lizette Aguirre DO Family Medicine  As needed [place] [phone]       [facility] Emergency Department Emergency Medicine  If symptoms worsen [address] [facility]  [address] [facility] [address], [place] [address], [place], [place], [address]    [address]  [facility] [address]  [phone]    Go tomorrow morning  You have a bed available there for alcohol detox  Patient's Medications   Discharge Prescriptions    No medications on file     No discharge procedures on file         ED Provider  Electronically Signed by     Rudi Connelly MD  10/15/19 4385

## 2019-10-16 NOTE — ED NOTES
Zeenat was instructed to no sit on the edge of gurney as it is uneven and could flip  Patient and sister verbalize understanding and patient is still sitting on edge of gurney        Shar Santillan RN  10/15/19 2004

## 2019-11-26 ENCOUNTER — OFFICE VISIT (OUTPATIENT)
Dept: FAMILY MEDICINE CLINIC | Facility: CLINIC | Age: 49
End: 2019-11-26
Payer: COMMERCIAL

## 2019-11-26 VITALS
BODY MASS INDEX: 29.55 KG/M2 | TEMPERATURE: 99.4 F | DIASTOLIC BLOOD PRESSURE: 100 MMHG | SYSTOLIC BLOOD PRESSURE: 140 MMHG | OXYGEN SATURATION: 97 % | HEART RATE: 113 BPM | HEIGHT: 67 IN | WEIGHT: 188.25 LBS

## 2019-11-26 DIAGNOSIS — M62.838 MUSCLE SPASM: ICD-10-CM

## 2019-11-26 DIAGNOSIS — G62.89 OTHER POLYNEUROPATHY: ICD-10-CM

## 2019-11-26 DIAGNOSIS — R00.0 TACHYCARDIA: ICD-10-CM

## 2019-11-26 DIAGNOSIS — G62.1 ALCOHOL-INDUCED POLYNEUROPATHY (HCC): ICD-10-CM

## 2019-11-26 DIAGNOSIS — F10.11 ALCOHOL ABUSE, IN REMISSION: ICD-10-CM

## 2019-11-26 DIAGNOSIS — I10 ESSENTIAL HYPERTENSION: Primary | ICD-10-CM

## 2019-11-26 DIAGNOSIS — F51.01 PRIMARY INSOMNIA: ICD-10-CM

## 2019-11-26 DIAGNOSIS — F39 MOOD DISORDER (HCC): ICD-10-CM

## 2019-11-26 PROBLEM — G62.9 PERIPHERAL NEUROPATHY: Status: ACTIVE | Noted: 2019-11-26

## 2019-11-26 PROCEDURE — 99214 OFFICE O/P EST MOD 30 MIN: CPT | Performed by: FAMILY MEDICINE

## 2019-11-26 RX ORDER — PROPRANOLOL HYDROCHLORIDE 10 MG/1
10 TABLET ORAL 2 TIMES DAILY
Qty: 60 TABLET | Refills: 2
Start: 2019-11-26

## 2019-11-26 RX ORDER — AMITRIPTYLINE HYDROCHLORIDE 25 MG/1
25 TABLET, FILM COATED ORAL
Qty: 30 TABLET | Refills: 2
Start: 2019-11-26

## 2019-11-26 RX ORDER — METHOCARBAMOL 500 MG/1
500 TABLET, FILM COATED ORAL 3 TIMES DAILY
Qty: 90 TABLET | Refills: 0
Start: 2019-11-26

## 2019-11-26 RX ORDER — PROPRANOLOL HYDROCHLORIDE 10 MG/1
TABLET ORAL
COMMUNITY
Start: 2019-11-22 | End: 2019-11-26 | Stop reason: SDUPTHER

## 2019-11-26 RX ORDER — AMITRIPTYLINE HYDROCHLORIDE 25 MG/1
TABLET, FILM COATED ORAL
COMMUNITY
Start: 2019-10-29 | End: 2019-11-26 | Stop reason: SDUPTHER

## 2019-11-26 RX ORDER — METHOCARBAMOL 500 MG/1
TABLET, FILM COATED ORAL
COMMUNITY
Start: 2019-10-27 | End: 2019-11-26 | Stop reason: SDUPTHER

## 2019-11-26 RX ORDER — LAMOTRIGINE 25 MG/1
25 TABLET ORAL DAILY
Qty: 30 TABLET | Refills: 1 | Status: SHIPPED | OUTPATIENT
Start: 2019-11-26

## 2019-11-26 NOTE — PATIENT INSTRUCTIONS
Restart propanolol 1 tab twice a day  Then start lamictal 25mg 1 tab daily - anxiety   Then Increase amytriptyline to 50mg at bedtime      Fax blood work to office - follow up  Liver function, potassium and sugar  Hypertension   AMBULATORY CARE:   Hypertension  is high blood pressure (BP)  Your BP is the force of your blood moving against the walls of your arteries  Normal BP is less than 120/80  Prehypertension is between 120/80 and 139/89  Hypertension is 140/90 or higher  Hypertension causes your BP to get so high that your heart has to work much harder than normal  This can damage your heart  You can control hypertension with a healthy lifestyle or medicines  A controlled blood pressure helps protect your organs, such as your heart, lungs, brain, and kidneys  Common symptoms include the following:   · Headache     · Blurred vision     · Chest pain     · Dizziness or weakness     · Trouble breathing    · Nosebleeds  Call 911 for any of the following:   · You have discomfort in your chest that feels like squeezing, pressure, fullness, or pain  · You become confused or have difficulty speaking  · You suddenly feel lightheaded or have trouble breathing  · You have pain or discomfort in your back, neck, jaw, stomach, or arm  Seek care immediately if:   · You have a severe headache or vision loss  · You have weakness in an arm or leg  Contact your healthcare provider if:   · You feel faint, dizzy, confused, or drowsy  · You have been taking your BP medicine and your BP is still higher than your healthcare provider says it should be  · You have questions or concerns about your condition or care  Treatment for hypertension  may include medicine to lower your BP and lower your cholesterol level  A low cholesterol level helps prevent heart disease and makes it easier to control your blood pressure  You may also need to make lifestyle changes  Take your medicine exactly as directed     Manage hypertension:  Talk with your healthcare provider about these and other ways to manage hypertension:  · Check your BP at home  Sit and rest for 5 minutes before you take your BP  Extend your arm and support it on a flat surface  Your arm should be at the same level as your heart  Follow the directions that came with your BP monitor  If possible, take at least 2 BP readings each time  Take your BP at least twice a day at the same times each day, such as morning and evening  Keep a record of your BP readings and bring it to your follow-up visits  Ask your healthcare provider what your BP should be  · Limit sodium (salt) as directed  Too much sodium can affect your fluid balance  Check labels to find low-sodium or no-salt-added foods  Some low-sodium foods use potassium salts for flavor  Too much potassium can also cause health problems  Your healthcare provider will tell you how much sodium and potassium are safe for you to have in a day  He or she may recommend that you limit sodium to 2,300 mg a day  · Follow the meal plan recommended by your healthcare provider  A dietitian or your provider can give you more information on low-sodium plans or the DASH (Dietary Approaches to Stop Hypertension) eating plan  The DASH plan is low in sodium, unhealthy fats, and total fat  It is high in potassium, calcium, and fiber  · Exercise to maintain a healthy weight  Exercise at least 30 minutes per day, on most days of the week  This will help decrease your blood pressure  Ask your healthcare provider about the best exercise plan for you  · Decrease stress  This may help lower your BP  Learn ways to relax, such as deep breathing or listening to music  · Limit alcohol  Women should limit alcohol to 1 drink a day  Men should limit alcohol to 2 drinks a day  A drink of alcohol is 12 ounces of beer, 5 ounces of wine, or 1½ ounces of liquor  · Do not smoke    Nicotine and other chemicals in cigarettes and cigars can increase your BP and also cause lung damage  Ask your healthcare provider for information if you currently smoke and need help to quit  E-cigarettes or smokeless tobacco still contain nicotine  Talk to your healthcare provider before you use these products  · Manage any other health conditions you have  Health conditions such as diabetes can increase your risk for hypertension  Follow your healthcare provider's instructions and take all your medicines as directed  Follow up with your healthcare provider as directed: You will need to return to have your BP checked and to have other lab tests done  Write down your questions so you remember to ask them during your visits  © 2017 [employer] Information is for End User's use only and may not be sold, redistributed or otherwise used for commercial purposes  All illustrations and images included in CareNotes® are the copyrighted property of [employer]  or Terry Zamudio  The above information is an  only  It is not intended as medical advice for individual conditions or treatments  Talk to your doctor, nurse or pharmacist before following any medical regimen to see if it is safe and effective for you

## 2019-11-26 NOTE — PROGRESS NOTES
Assessment/Plan:      Diagnoses and all orders for this visit:    Essential hypertension  Comments:  Uncontrolled, continue lisinopril and restart propanolol twice a day 10 mg, monitor blood pressure with home blood pressure cuff  Orders:  -     propranolol (INDERAL) 10 mg tablet; Take 1 tablet (10 mg total) by mouth 2 (two) times a day    Tachycardia  Comments:  Tachycardic, restart propanolol 10 mg twice a day    Alcohol abuse, in remission  Comments: Will need psychiatry evaluation    Alcohol-induced polyneuropathy ([facility] [address] )  Comments:  Improved since stopping alcohol, schedule EMG    Mood disorder ([facility])  Comments:  Anxiety primarily, Atarax not helpful, BuSpar worsen symptoms, SSRIs not helpful  Patient will start Lamictal 25 mg 1 tablet daily and will increase gradually   Orders:  -     lamoTRIgine (LaMICtal) 25 mg tablet; Take 1 tablet (25 mg total) by mouth daily    Muscle spasm  Comments:  Robaxin 4 times a day as needed for leg cramps  Orders:  -     methocarbamol (ROBAXIN) 500 mg tablet; Take 1 tablet (500 mg total) by mouth 3 (three) times a day    Primary insomnia  Comments:  Patient can fall asleep but not stay asleep  Increase amitriptyline to 50 mg at bedtime  Orders:  -     amitriptyline (ELAVIL) 25 mg tablet; Take 1 tablet (25 mg total) by mouth daily at bedtime    Other polyneuropathy  Comments:  Likely secondary to alcohol, schedule EMG  Orders:  -     EMG 2 limb lower extremity; Future    Other orders  -     Discontinue: methocarbamol (ROBAXIN) 500 mg tablet  -     Discontinue: amitriptyline (ELAVIL) 25 mg tablet  -     Discontinue: propranolol (INDERAL) 10 mg tablet          Subjective:  Chief Complaint   Patient presents with    Follow-up     Pt here today because she would like to go over medications with you  She was in rehab and was put on several different medications  CMP/CBC complete at hospital  Annual exam due  Patient ID: Augustina Singletary is a 52 y o  female      Patient is here with her son today  pt   has been in rehab at [facility] in [place]- 37 days, for alcohol  States she has not had a drink  Was prescribed medications upon discharge  Propanolol 10 mg twice a day, lisinopril 10 mg, Robaxin 4 times a day and amitriptyline 25 mg at bedtime  Started on propanolol for blood pressure and pulse  All she was in rehab, she was given medications that made her dizzy  She eliminated some medications to see if the dizziness would improve  1 of the medications she stopped was propanolol  She had also been on Topamax and also Ativan short-term  She denies any chest pain, shortness of breath or headaches  She had blood work repeated in rehab after having elevated liver enzymes and low potassium in the hospital   Also her sugar was elevated  Patient states upon recheck the labs had improved, liver enzymes were normal and potassium was improved but not to normal   She will have the labs faxed to us  Review of Systems   Constitutional: Positive for fatigue  Negative for fever  HENT: Negative  Eyes: Negative  Respiratory: Negative  Negative for cough  Cardiovascular: Negative  Gastrointestinal: Negative  Endocrine: Negative  Genitourinary: Negative  Musculoskeletal: Negative  Skin: Negative  Allergic/Immunologic: Negative  Neurological: Positive for numbness  Psychiatric/Behavioral: Positive for sleep disturbance  The patient is nervous/anxious  The following portions of the patient's history were reviewed and updated as appropriate: allergies, current medications, past family history, past medical history, past social history, past surgical history and problem list     Objective:  Vitals:    11/26/19 1220 11/26/19 1300   BP: (!) 146/101 140/100   Pulse: (!) 113    Temp: 99 4 °F (37 4 °C)    SpO2: 97%    Weight: 85 4 kg (188 lb 4 oz)    Height: 5' 6 5" (1 689 m)       Physical Exam   Constitutional: She is oriented to person, place, and time  She appears well-developed and well-nourished  HENT:   Head: Normocephalic and atraumatic  Right Ear: External ear normal    Left Ear: External ear normal    Nose: Nose normal    Mouth/Throat: Oropharynx is clear and moist    Cardiovascular: Normal rate, regular rhythm, normal heart sounds and intact distal pulses  Pulmonary/Chest: Effort normal and breath sounds normal  She has no wheezes  She has no rales  Abdominal: Soft  Bowel sounds are normal    Musculoskeletal: She exhibits no edema, tenderness or deformity  Neurological: She is alert and oriented to person, place, and time  Skin: Skin is warm and dry  Psychiatric: Her behavior is normal  Judgment and thought content normal    Patient appears anxious   Nursing note and vitals reviewed

## 2019-12-05 ENCOUNTER — TELEPHONE (OUTPATIENT)
Dept: FAMILY MEDICINE CLINIC | Facility: CLINIC | Age: 49
End: 2019-12-05

## 2020-10-31 ENCOUNTER — HOSPITAL ENCOUNTER (EMERGENCY)
Facility: HOSPITAL | Age: 50
Discharge: HOME | End: 2020-11-01
Attending: EMERGENCY MEDICINE | Admitting: EMERGENCY MEDICINE

## 2020-10-31 VITALS
OXYGEN SATURATION: 97 % | RESPIRATION RATE: 18 BRPM | TEMPERATURE: 98 F | DIASTOLIC BLOOD PRESSURE: 89 MMHG | BODY MASS INDEX: 29.41 KG/M2 | WEIGHT: 185 LBS | HEART RATE: 92 BPM | SYSTOLIC BLOOD PRESSURE: 150 MMHG

## 2020-10-31 DIAGNOSIS — R45.851 SUICIDAL IDEATIONS: Primary | ICD-10-CM

## 2020-10-31 DIAGNOSIS — IMO0002 SELF-INFLICTED INJURY: ICD-10-CM

## 2020-10-31 PROCEDURE — 99285 EMERGENCY DEPT VISIT HI MDM: CPT | Performed by: EMERGENCY MEDICINE

## 2020-10-31 PROCEDURE — 81002 URINALYSIS NONAUTO W/O SCOPE: CPT | Performed by: EMERGENCY MEDICINE

## 2020-10-31 PROCEDURE — 99284 EMERGENCY DEPT VISIT MOD MDM: CPT

## 2020-10-31 PROCEDURE — 81025 URINE PREGNANCY TEST: CPT | Performed by: EMERGENCY MEDICINE

## 2020-10-31 PROCEDURE — 82075 ASSAY OF BREATH ETHANOL: CPT | Performed by: EMERGENCY MEDICINE

## 2020-11-01 LAB
ALBUMIN SERPL BCP-MCNC: 3.9 G/DL (ref 3.5–5)
ALP SERPL-CCNC: 90 U/L (ref 46–116)
ALT SERPL W P-5'-P-CCNC: 19 U/L (ref 12–78)
AMPHETAMINES SERPL QL SCN: NEGATIVE
ANION GAP SERPL CALCULATED.3IONS-SCNC: 12 MMOL/L (ref 4–13)
AST SERPL W P-5'-P-CCNC: 18 U/L (ref 5–45)
BARBITURATES UR QL: NEGATIVE
BASOPHILS # BLD AUTO: 0.05 THOUSANDS/ΜL (ref 0–0.1)
BASOPHILS NFR BLD AUTO: 1 % (ref 0–1)
BENZODIAZ UR QL: NEGATIVE
BILIRUB SERPL-MCNC: 0.1 MG/DL (ref 0.2–1)
BUN SERPL-MCNC: 8 MG/DL (ref 5–25)
CALCIUM SERPL-MCNC: 9 MG/DL (ref 8.3–10.1)
CHLORIDE SERPL-SCNC: 110 MMOL/L (ref 100–108)
CLARITY, POC: NORMAL
CO2 SERPL-SCNC: 27 MMOL/L (ref 21–32)
COCAINE UR QL: NEGATIVE
COLOR, POC: YELLOW
CREAT SERPL-MCNC: 0.61 MG/DL (ref 0.6–1.3)
EOSINOPHIL # BLD AUTO: 0.11 THOUSAND/ΜL (ref 0–0.61)
EOSINOPHIL NFR BLD AUTO: 1 % (ref 0–6)
ERYTHROCYTE [DISTWIDTH] IN BLOOD BY AUTOMATED COUNT: 15.2 % (ref 11.6–15.1)
ETHANOL EXG-MCNC: 0.01 MG/DL
ETHANOL EXG-MCNC: 0.27 MG/DL
EXT BILIRUBIN, UA: NORMAL
EXT BLOOD URINE: NORMAL
EXT GLUCOSE, UA: NORMAL
EXT KETONES: NORMAL
EXT NITRITE, UA: NORMAL
EXT PH, UA: 6
EXT PREG TEST URINE: NEGATIVE
EXT PROTEIN, UA: NORMAL
EXT SPECIFIC GRAVITY, UA: 1.03
EXT UROBILINOGEN: NORMAL
EXT. CONTROL ED NAV: NORMAL
GFR SERPL CREATININE-BSD FRML MDRD: 106 ML/MIN/1.73SQ M
GLUCOSE SERPL-MCNC: 87 MG/DL (ref 65–140)
HCT VFR BLD AUTO: 43.7 % (ref 34.8–46.1)
HGB BLD-MCNC: 14.6 G/DL (ref 11.5–15.4)
IMM GRANULOCYTES # BLD AUTO: 0.09 THOUSAND/UL (ref 0–0.2)
IMM GRANULOCYTES NFR BLD AUTO: 1 % (ref 0–2)
LYMPHOCYTES # BLD AUTO: 3.87 THOUSANDS/ΜL (ref 0.6–4.47)
LYMPHOCYTES NFR BLD AUTO: 41 % (ref 14–44)
MCH RBC QN AUTO: 32.4 PG (ref 26.8–34.3)
MCHC RBC AUTO-ENTMCNC: 33.4 G/DL (ref 31.4–37.4)
MCV RBC AUTO: 97 FL (ref 82–98)
METHADONE UR QL: NEGATIVE
MONOCYTES # BLD AUTO: 0.84 THOUSAND/ΜL (ref 0.17–1.22)
MONOCYTES NFR BLD AUTO: 9 % (ref 4–12)
NEUTROPHILS # BLD AUTO: 4.4 THOUSANDS/ΜL (ref 1.85–7.62)
NEUTS SEG NFR BLD AUTO: 47 % (ref 43–75)
NRBC BLD AUTO-RTO: 0 /100 WBCS
OPIATES UR QL SCN: NEGATIVE
OXYCODONE+OXYMORPHONE UR QL SCN: NEGATIVE
PCP UR QL: NEGATIVE
PLATELET # BLD AUTO: 360 THOUSANDS/UL (ref 149–390)
PMV BLD AUTO: 10.1 FL (ref 8.9–12.7)
POTASSIUM SERPL-SCNC: 3.9 MMOL/L (ref 3.5–5.3)
PROT SERPL-MCNC: 8.2 G/DL (ref 6.4–8.2)
RBC # BLD AUTO: 4.51 MILLION/UL (ref 3.81–5.12)
SODIUM SERPL-SCNC: 149 MMOL/L (ref 136–145)
THC UR QL: POSITIVE
TSH SERPL DL<=0.05 MIU/L-ACNC: 2.1 UIU/ML (ref 0.36–3.74)
WBC # BLD AUTO: 9.36 THOUSAND/UL (ref 4.31–10.16)
WBC # BLD EST: NORMAL 10*3/UL

## 2020-11-01 PROCEDURE — 82075 ASSAY OF BREATH ETHANOL: CPT | Performed by: EMERGENCY MEDICINE

## 2020-11-01 PROCEDURE — 80053 COMPREHEN METABOLIC PANEL: CPT | Performed by: EMERGENCY MEDICINE

## 2020-11-01 PROCEDURE — 36415 COLL VENOUS BLD VENIPUNCTURE: CPT | Performed by: EMERGENCY MEDICINE

## 2020-11-01 PROCEDURE — 85025 COMPLETE CBC W/AUTO DIFF WBC: CPT | Performed by: EMERGENCY MEDICINE

## 2020-11-01 PROCEDURE — 80307 DRUG TEST PRSMV CHEM ANLYZR: CPT | Performed by: EMERGENCY MEDICINE

## 2020-11-01 PROCEDURE — 84443 ASSAY THYROID STIM HORMONE: CPT | Performed by: EMERGENCY MEDICINE

## 2021-08-08 ENCOUNTER — APPOINTMENT (EMERGENCY)
Dept: RADIOLOGY | Facility: HOSPITAL | Age: 51
DRG: 184 | End: 2021-08-08

## 2021-08-08 ENCOUNTER — HOSPITAL ENCOUNTER (INPATIENT)
Facility: HOSPITAL | Age: 51
LOS: 18 days | Discharge: HOME | DRG: 184 | End: 2021-08-26
Attending: STUDENT IN AN ORGANIZED HEALTH CARE EDUCATION/TRAINING PROGRAM | Admitting: SURGERY
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
## 2021-08-08 DIAGNOSIS — S22.41XA CLOSED FRACTURE OF MULTIPLE RIBS OF RIGHT SIDE, INITIAL ENCOUNTER: ICD-10-CM

## 2021-08-08 DIAGNOSIS — S42.001A CLOSED NONDISPLACED FRACTURE OF RIGHT CLAVICLE, UNSPECIFIED PART OF CLAVICLE, INITIAL ENCOUNTER: ICD-10-CM
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
## 2021-08-08 DIAGNOSIS — F41.9 ANXIETY: Primary | ICD-10-CM

## 2021-08-08 LAB
ANION GAP SERPL CALCULATED.3IONS-SCNC: 5 MMOL/L (ref 4–13)
APAP SERPL-MCNC: <2 UG/ML (ref 10–20)
BASE EX.OXY STD BLDV CALC-SCNC: 85.4 % (ref 60–80)
BASE EXCESS BLDA CALC-SCNC: 1 MMOL/L (ref -2–3)
BASE EXCESS BLDV CALC-SCNC: -6.3 MMOL/L
BASOPHILS # BLD AUTO: 0.05 THOUSANDS/ΜL (ref 0–0.1)
BASOPHILS NFR BLD AUTO: 1 % (ref 0–1)
BUN SERPL-MCNC: 9 MG/DL (ref 5–25)
CALCIUM SERPL-MCNC: 8.3 MG/DL (ref 8.3–10.1)
CHLORIDE SERPL-SCNC: 113 MMOL/L (ref 100–108)
CO2 SERPL-SCNC: 28 MMOL/L (ref 21–32)
CREAT SERPL-MCNC: 0.47 MG/DL (ref 0.6–1.3)
EOSINOPHIL # BLD AUTO: 0.07 THOUSAND/ΜL (ref 0–0.61)
EOSINOPHIL NFR BLD AUTO: 1 % (ref 0–6)
ERYTHROCYTE [DISTWIDTH] IN BLOOD BY AUTOMATED COUNT: 18.3 % (ref 11.6–15.1)
ETHANOL SERPL-MCNC: 376 MG/DL (ref 0–3)
GFR SERPL CREATININE-BSD FRML MDRD: 115 ML/MIN/1.73SQ M
GLUCOSE SERPL-MCNC: 96 MG/DL (ref 65–140)
GLUCOSE SERPL-MCNC: 97 MG/DL (ref 65–140)
HCO3 BLDA-SCNC: 23.2 MMOL/L (ref 24–30)
HCO3 BLDV-SCNC: 17.7 MMOL/L (ref 24–30)
HCT VFR BLD AUTO: 40.9 % (ref 34.8–46.1)
HCT VFR BLD CALC: 39 % (ref 34.8–46.1)
HGB BLD-MCNC: 14 G/DL (ref 11.5–15.4)
HGB BLDA-MCNC: 13.3 G/DL (ref 11.5–15.4)
IMM GRANULOCYTES # BLD AUTO: 0.08 THOUSAND/UL (ref 0–0.2)
IMM GRANULOCYTES NFR BLD AUTO: 1 % (ref 0–2)
INR PPP: 0.87 (ref 0.84–1.19)
LYMPHOCYTES # BLD AUTO: 2.48 THOUSANDS/ΜL (ref 0.6–4.47)
LYMPHOCYTES NFR BLD AUTO: 34 % (ref 14–44)
MCH RBC QN AUTO: 32.6 PG (ref 26.8–34.3)
MCHC RBC AUTO-ENTMCNC: 34.2 G/DL (ref 31.4–37.4)
MCV RBC AUTO: 95 FL (ref 82–98)
MONOCYTES # BLD AUTO: 0.54 THOUSAND/ΜL (ref 0.17–1.22)
MONOCYTES NFR BLD AUTO: 8 % (ref 4–12)
NEUTROPHILS # BLD AUTO: 3.99 THOUSANDS/ΜL (ref 1.85–7.62)
NEUTS SEG NFR BLD AUTO: 55 % (ref 43–75)
NRBC BLD AUTO-RTO: 0 /100 WBCS
O2 CT BLDV-SCNC: 15.3 ML/DL
PCO2 BLD: 24 MMOL/L (ref 21–32)
PCO2 BLD: 28.4 MM HG (ref 42–50)
PCO2 BLDV: 30.9 MM HG (ref 42–50)
PH BLD: 7.52 [PH] (ref 7.3–7.4)
PH BLDV: 7.38 [PH] (ref 7.3–7.4)
PLATELET # BLD AUTO: 301 THOUSANDS/UL (ref 149–390)
PMV BLD AUTO: 10 FL (ref 8.9–12.7)
PO2 BLD: 37 MM HG (ref 35–45)
PO2 BLDV: 63.8 MM HG (ref 35–45)
POTASSIUM BLD-SCNC: 7.5 MMOL/L (ref 3.5–5.3)
POTASSIUM SERPL-SCNC: 3.8 MMOL/L (ref 3.5–5.3)
PROTHROMBIN TIME: 11.9 SECONDS (ref 11.6–14.5)
RBC # BLD AUTO: 4.29 MILLION/UL (ref 3.81–5.12)
SALICYLATES SERPL-MCNC: 4 MG/DL (ref 3–20)
SAO2 % BLD FROM PO2: 79 % (ref 60–85)
SODIUM BLD-SCNC: 141 MMOL/L (ref 136–145)
SODIUM SERPL-SCNC: 146 MMOL/L (ref 136–145)
SPECIMEN SOURCE: ABNORMAL
WBC # BLD AUTO: 7.21 THOUSAND/UL (ref 4.31–10.16)

## 2021-08-08 PROCEDURE — 85610 PROTHROMBIN TIME: CPT | Performed by: EMERGENCY MEDICINE

## 2021-08-08 PROCEDURE — 74177 CT ABD & PELVIS W/CONTRAST: CPT

## 2021-08-08 PROCEDURE — 80179 DRUG ASSAY SALICYLATE: CPT | Performed by: EMERGENCY MEDICINE

## 2021-08-08 PROCEDURE — 99223 1ST HOSP IP/OBS HIGH 75: CPT | Performed by: SURGERY

## 2021-08-08 PROCEDURE — 82947 ASSAY GLUCOSE BLOOD QUANT: CPT

## 2021-08-08 PROCEDURE — 36415 COLL VENOUS BLD VENIPUNCTURE: CPT | Performed by: EMERGENCY MEDICINE

## 2021-08-08 PROCEDURE — 82805 BLOOD GASES W/O2 SATURATION: CPT | Performed by: EMERGENCY MEDICINE

## 2021-08-08 PROCEDURE — 93005 ELECTROCARDIOGRAM TRACING: CPT

## 2021-08-08 PROCEDURE — 82077 ASSAY SPEC XCP UR&BREATH IA: CPT | Performed by: EMERGENCY MEDICINE

## 2021-08-08 PROCEDURE — 70450 CT HEAD/BRAIN W/O DYE: CPT

## 2021-08-08 PROCEDURE — 72125 CT NECK SPINE W/O DYE: CPT

## 2021-08-08 PROCEDURE — 84295 ASSAY OF SERUM SODIUM: CPT

## 2021-08-08 PROCEDURE — 80048 BASIC METABOLIC PNL TOTAL CA: CPT | Performed by: EMERGENCY MEDICINE

## 2021-08-08 PROCEDURE — 85025 COMPLETE CBC W/AUTO DIFF WBC: CPT | Performed by: EMERGENCY MEDICINE

## 2021-08-08 PROCEDURE — 71260 CT THORAX DX C+: CPT

## 2021-08-08 PROCEDURE — 99285 EMERGENCY DEPT VISIT HI MDM: CPT

## 2021-08-08 PROCEDURE — 85014 HEMATOCRIT: CPT

## 2021-08-08 PROCEDURE — NC001 PR NO CHARGE: Performed by: EMERGENCY MEDICINE

## 2021-08-08 PROCEDURE — 82803 BLOOD GASES ANY COMBINATION: CPT

## 2021-08-08 PROCEDURE — 84132 ASSAY OF SERUM POTASSIUM: CPT

## 2021-08-08 PROCEDURE — 80143 DRUG ASSAY ACETAMINOPHEN: CPT | Performed by: EMERGENCY MEDICINE

## 2021-08-08 PROCEDURE — 2W38XYZ IMMOBILIZATION OF RIGHT UPPER EXTREMITY USING OTHER DEVICE: ICD-10-PCS | Performed by: SURGERY

## 2021-08-08 RX ORDER — HYDROMORPHONE HCL/PF 1 MG/ML
0.5 SYRINGE (ML) INJECTION
Status: DISCONTINUED | OUTPATIENT
Start: 2021-08-08 | End: 2021-08-16

## 2021-08-08 RX ORDER — GABAPENTIN 300 MG/1
300 CAPSULE ORAL
Status: DISCONTINUED | OUTPATIENT
Start: 2021-08-09 | End: 2021-08-26 | Stop reason: HOSPADM

## 2021-08-08 RX ORDER — OXYCODONE HYDROCHLORIDE 10 MG/1
10 TABLET ORAL EVERY 4 HOURS PRN
Status: DISCONTINUED | OUTPATIENT
Start: 2021-08-08 | End: 2021-08-26 | Stop reason: HOSPADM

## 2021-08-08 RX ORDER — SENNOSIDES 8.6 MG
2 TABLET ORAL DAILY
Status: DISCONTINUED | OUTPATIENT
Start: 2021-08-09 | End: 2021-08-26 | Stop reason: HOSPADM

## 2021-08-08 RX ORDER — FOLIC ACID 1 MG/1
1 TABLET ORAL DAILY
Status: DISCONTINUED | OUTPATIENT
Start: 2021-08-09 | End: 2021-08-26 | Stop reason: HOSPADM

## 2021-08-08 RX ORDER — ACETAMINOPHEN 325 MG/1
975 TABLET ORAL EVERY 8 HOURS SCHEDULED
Status: DISCONTINUED | OUTPATIENT
Start: 2021-08-08 | End: 2021-08-26 | Stop reason: HOSPADM

## 2021-08-08 RX ORDER — OXYCODONE HYDROCHLORIDE 5 MG/1
5 TABLET ORAL EVERY 4 HOURS PRN
Status: DISCONTINUED | OUTPATIENT
Start: 2021-08-08 | End: 2021-08-26 | Stop reason: HOSPADM

## 2021-08-08 RX ORDER — ONDANSETRON 2 MG/ML
4 INJECTION INTRAMUSCULAR; INTRAVENOUS EVERY 6 HOURS PRN
Status: DISCONTINUED | OUTPATIENT
Start: 2021-08-08 | End: 2021-08-19

## 2021-08-08 RX ORDER — LANOLIN ALCOHOL/MO/W.PET/CERES
100 CREAM (GRAM) TOPICAL DAILY
Status: DISCONTINUED | OUTPATIENT
Start: 2021-08-09 | End: 2021-08-26 | Stop reason: HOSPADM

## 2021-08-08 RX ORDER — METHOCARBAMOL 500 MG/1
500 TABLET, FILM COATED ORAL EVERY 6 HOURS SCHEDULED
Status: DISCONTINUED | OUTPATIENT
Start: 2021-08-09 | End: 2021-08-12

## 2021-08-08 RX ADMIN — IOHEXOL 100 ML: 350 INJECTION, SOLUTION INTRAVENOUS at 19:32

## 2021-08-08 NOTE — ED PROVIDER NOTES
Emergency Department Airway Evaluation and Management Form    History  Obtained from:  EMSPatient has no allergy information on record  No chief complaint on file  Patient is a 51-year-old female status post down fall flight of stairs positive ETOH on board patient is acutely altered  Trauma alert requested by EMS  No past medical history on file  No past surgical history on file  No family history on file  Social History     Tobacco Use    Smoking status: Not on file   Substance Use Topics    Alcohol use: Not on file    Drug use: Not on file     I have reviewed and agree with the history as documented      Review of Systems    Physical Exam  /79   Pulse 83   Temp 97 6 °F (36 4 °C) (Rectal)   Resp 20   Wt 79 6 kg (175 lb 7 8 oz)   SpO2 100% Comment: 2L    Physical Exam  HENT:      Mouth/Throat:      Comments: Airway open and patent        ED Medications  Medications   iohexol (OMNIPAQUE) 350 MG/ML injection (MULTI-DOSE) 100 mL (has no administration in time range)       Intubation  Procedures    Notes  No acute airway intervention indicated    Final Diagnosis  Final diagnoses:   None       ED Provider  Electronically Signed by     Garcia Vargas MD  08/08/21 3005

## 2021-08-09 ENCOUNTER — APPOINTMENT (INPATIENT)
Dept: RADIOLOGY | Facility: HOSPITAL | Age: 51
DRG: 184 | End: 2021-08-09

## 2021-08-09 PROBLEM — S42.001A RIGHT CLAVICLE FRACTURE: Status: ACTIVE | Noted: 2021-08-09

## 2021-08-09 PROBLEM — F10.929 ALCOHOL INTOXICATION (HCC): Status: ACTIVE | Noted: 2021-08-09

## 2021-08-09 PROBLEM — S22.41XA FRACTURE OF MULTIPLE RIBS OF RIGHT SIDE: Status: ACTIVE | Noted: 2021-08-09

## 2021-08-09 PROBLEM — K63.9 COLON ABNORMALITY: Status: ACTIVE | Noted: 2021-08-09

## 2021-08-09 LAB
ANION GAP SERPL CALCULATED.3IONS-SCNC: 11 MMOL/L (ref 4–13)
ATRIAL RATE: 84 BPM
BUN SERPL-MCNC: 9 MG/DL (ref 5–25)
CALCIUM SERPL-MCNC: 7.7 MG/DL (ref 8.3–10.1)
CHLORIDE SERPL-SCNC: 107 MMOL/L (ref 100–108)
CO2 SERPL-SCNC: 23 MMOL/L (ref 21–32)
CREAT SERPL-MCNC: 0.34 MG/DL (ref 0.6–1.3)
ERYTHROCYTE [DISTWIDTH] IN BLOOD BY AUTOMATED COUNT: 17.6 % (ref 11.6–15.1)
GFR SERPL CREATININE-BSD FRML MDRD: 128 ML/MIN/1.73SQ M
GLUCOSE SERPL-MCNC: 85 MG/DL (ref 65–140)
HCT VFR BLD AUTO: 37.4 % (ref 34.8–46.1)
HGB BLD-MCNC: 12.6 G/DL (ref 11.5–15.4)
MAGNESIUM SERPL-MCNC: 1.9 MG/DL (ref 1.6–2.6)
MCH RBC QN AUTO: 31.7 PG (ref 26.8–34.3)
MCHC RBC AUTO-ENTMCNC: 33.7 G/DL (ref 31.4–37.4)
MCV RBC AUTO: 94 FL (ref 82–98)
P AXIS: 56 DEGREES
PLATELET # BLD AUTO: 250 THOUSANDS/UL (ref 149–390)
PLATELET # BLD AUTO: 271 THOUSANDS/UL (ref 149–390)
PMV BLD AUTO: 10.5 FL (ref 8.9–12.7)
PMV BLD AUTO: 9.8 FL (ref 8.9–12.7)
POTASSIUM SERPL-SCNC: 3.7 MMOL/L (ref 3.5–5.3)
PR INTERVAL: 130 MS
QRS AXIS: 1 DEGREES
QRSD INTERVAL: 92 MS
QT INTERVAL: 436 MS
QTC INTERVAL: 515 MS
RBC # BLD AUTO: 3.97 MILLION/UL (ref 3.81–5.12)
SODIUM SERPL-SCNC: 141 MMOL/L (ref 136–145)
T WAVE AXIS: 65 DEGREES
VENTRICULAR RATE: 84 BPM
WBC # BLD AUTO: 8.2 THOUSAND/UL (ref 4.31–10.16)

## 2021-08-09 PROCEDURE — 99232 SBSQ HOSP IP/OBS MODERATE 35: CPT | Performed by: EMERGENCY MEDICINE

## 2021-08-09 PROCEDURE — 71046 X-RAY EXAM CHEST 2 VIEWS: CPT

## 2021-08-09 PROCEDURE — 93010 ELECTROCARDIOGRAM REPORT: CPT | Performed by: INTERNAL MEDICINE

## 2021-08-09 PROCEDURE — NC001 PR NO CHARGE: Performed by: ORTHOPAEDIC SURGERY

## 2021-08-09 PROCEDURE — 99254 IP/OBS CNSLTJ NEW/EST MOD 60: CPT | Performed by: ANESTHESIOLOGY

## 2021-08-09 PROCEDURE — 36415 COLL VENOUS BLD VENIPUNCTURE: CPT | Performed by: EMERGENCY MEDICINE

## 2021-08-09 PROCEDURE — 73000 X-RAY EXAM OF COLLAR BONE: CPT

## 2021-08-09 PROCEDURE — 85027 COMPLETE CBC AUTOMATED: CPT | Performed by: EMERGENCY MEDICINE

## 2021-08-09 PROCEDURE — 83735 ASSAY OF MAGNESIUM: CPT | Performed by: EMERGENCY MEDICINE

## 2021-08-09 PROCEDURE — 80048 BASIC METABOLIC PNL TOTAL CA: CPT | Performed by: EMERGENCY MEDICINE

## 2021-08-09 PROCEDURE — 85049 AUTOMATED PLATELET COUNT: CPT | Performed by: EMERGENCY MEDICINE

## 2021-08-09 RX ORDER — LISINOPRIL 10 MG/1
10 TABLET ORAL DAILY
COMMUNITY

## 2021-08-09 RX ORDER — LORAZEPAM 1 MG/1
2 TABLET ORAL ONCE
Status: COMPLETED | OUTPATIENT
Start: 2021-08-09 | End: 2021-08-09

## 2021-08-09 RX ADMIN — OXYCODONE HYDROCHLORIDE 5 MG: 5 TABLET ORAL at 01:05

## 2021-08-09 RX ADMIN — OXYCODONE HYDROCHLORIDE 10 MG: 10 TABLET ORAL at 17:01

## 2021-08-09 RX ADMIN — ACETAMINOPHEN 975 MG: 325 TABLET, FILM COATED ORAL at 13:00

## 2021-08-09 RX ADMIN — OXYCODONE HYDROCHLORIDE 10 MG: 10 TABLET ORAL at 21:05

## 2021-08-09 RX ADMIN — GABAPENTIN 300 MG: 300 CAPSULE ORAL at 21:04

## 2021-08-09 RX ADMIN — THIAMINE HCL TAB 100 MG 100 MG: 100 TAB at 08:24

## 2021-08-09 RX ADMIN — FOLIC ACID 1 MG: 1 TABLET ORAL at 08:24

## 2021-08-09 RX ADMIN — OXYCODONE HYDROCHLORIDE 10 MG: 10 TABLET ORAL at 05:25

## 2021-08-09 RX ADMIN — METHOCARBAMOL 500 MG: 500 TABLET, FILM COATED ORAL at 05:25

## 2021-08-09 RX ADMIN — METHOCARBAMOL 500 MG: 500 TABLET, FILM COATED ORAL at 11:35

## 2021-08-09 RX ADMIN — METHOCARBAMOL 500 MG: 500 TABLET, FILM COATED ORAL at 23:14

## 2021-08-09 RX ADMIN — METHOCARBAMOL 500 MG: 500 TABLET, FILM COATED ORAL at 17:01

## 2021-08-09 RX ADMIN — METHOCARBAMOL 500 MG: 500 TABLET, FILM COATED ORAL at 01:05

## 2021-08-09 RX ADMIN — LORAZEPAM 2 MG: 1 TABLET ORAL at 17:19

## 2021-08-09 RX ADMIN — ENOXAPARIN SODIUM 30 MG: 30 INJECTION SUBCUTANEOUS at 21:04

## 2021-08-09 RX ADMIN — ACETAMINOPHEN 975 MG: 325 TABLET, FILM COATED ORAL at 21:04

## 2021-08-09 RX ADMIN — ACETAMINOPHEN 975 MG: 325 TABLET, FILM COATED ORAL at 01:06

## 2021-08-09 RX ADMIN — HYDROMORPHONE HYDROCHLORIDE 0.5 MG: 1 INJECTION, SOLUTION INTRAMUSCULAR; INTRAVENOUS; SUBCUTANEOUS at 12:54

## 2021-08-09 RX ADMIN — OXYCODONE HYDROCHLORIDE 10 MG: 10 TABLET ORAL at 10:49

## 2021-08-09 RX ADMIN — HYDROMORPHONE HYDROCHLORIDE 0.5 MG: 1 INJECTION, SOLUTION INTRAMUSCULAR; INTRAVENOUS; SUBCUTANEOUS at 08:20

## 2021-08-09 RX ADMIN — ACETAMINOPHEN 975 MG: 325 TABLET, FILM COATED ORAL at 05:25

## 2021-08-09 NOTE — ED PROCEDURE NOTE
Procedure  POC FAST US    Date/Time: 8/8/2021 10:38 PM  Performed by: Dominique Vaughan MD  Authorized by: Dominique Vaughan MD     Patient location:  Trauma  Other Assisting Provider: Yes (comment) Yang Correia)    Procedure details:     Exam Type:  Diagnostic    Indications: blunt abdominal trauma      Assess for:  Intra-abdominal fluid and pericardial effusion    Technique: FAST      Image quality: diagnostic      Image availability:  Images available in PACS  FAST Findings:     RUQ (Hepatorenal) free fluid: absent      LUQ (Splenorenal) free fluid: absent      Suprapubic free fluid: absent      Cardiac wall motion: identified      Pericardial effusion: absent    Interpretation:     Impressions: negative    Comments:      Neg fast                     Dominique Vaughan MD  08/08/21 4590
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
## 2021-08-09 NOTE — PROGRESS NOTES
[facility]  Progress Note - Satish Quinteros 1970, 46 y o  female MRN: [id number]  Unit/Bed#: [address] [facility] [address] 622-01 Encounter: [id number]  Primary Care Provider: Jose Luis Rodriguez DO   Date and time admitted to hospital: 8/8/2021  7:15 PM    Colon abnormality  Assessment & Plan  -found to have lobular wall thickening of the sigmoid colon, colitis versus mass  -will likely need colonoscopy referral to rule out cancer  -unable to inform patient about incidental findings at this time due to gross alcohol intoxication    Right clavicle fracture  Assessment & Plan  -placed in sling    Alcohol intoxication ([facility] [address] )  Assessment & Plan  -patient will be admitted to step-down to given her acute alcohol intoxication with waxing and waning GCS with multiple rib fractures  -thymine and folate, CIWA protocol  -patient endorsing suicidal thoughts today per EMS, will need to re-evaluate when patient is sober    * Fracture of multiple ribs of right side  Assessment & Plan  -right 2 through 7 rib fractures, questionable flail segment at 2 through 5  -initially mildly hypoxic in the Trauma [place] to the low 90s, currently saturating well on room air  -APS consulted  -pain control, multimodal pain regimen   -no hemo or pneumothorax but does have posttraumatic atelectasis in the anterior lateral right upper lobe  -will obtain a follow-up chest x-ray tomorrow        Disposition:  Continue inpatient care      SUBJECTIVE:  Chief Complaint: "I don't remember what happened"    Subjective:  Patient is complaining this morning of right-sided clavicle pain where she does have a fracture, currently in a sling  Patient was found last night to have multiple rib fractures on the right side  Also found to have incidental finding on CT abdomen of her colon    Patient alert grossly intoxicated this morning    OBJECTIVE:     Meds/Allergies     Current Facility-Administered Medications:     acetaminophen (TYLENOL) tablet 317 mg, 975 mg, Oral, Q8H [facility], Jm Glover MD, 975 mg at 08/09/21 1300    enoxaparin (LOVENOX) subcutaneous injection 30 mg, 30 mg, Subcutaneous, Q12H, Rubia Glover MD    folic acid (FOLVITE) tablet 1 mg, 1 mg, Oral, Daily, Jm Glover MD, 1 mg at 08/09/21 0824    gabapentin (NEURONTIN) capsule 300 mg, 300 mg, Oral, HS, Jm Glover MD    HYDROmorphone (DILAUDID) injection 0 5 mg, 0 5 mg, Intravenous, Q3H PRN, Madeline Corcoran MD, 0 5 mg at 08/09/21 1254    methocarbamol (ROBAXIN) tablet 500 mg, 500 mg, Oral, Q6H [facility], Jm Glover MD, 500 mg at 08/09/21 1135    ondansetron (ZOFRAN) injection 4 mg, 4 mg, Intravenous, Q6H PRN, Madeline Corcoran MD    oxyCODONE (ROXICODONE) immediate release tablet 10 mg, 10 mg, Oral, Q4H PRN, Jm Glover MD, 10 mg at 08/09/21 1049    oxyCODONE (ROXICODONE) IR tablet 5 mg, 5 mg, Oral, Q4H PRN, Jm Glover MD, 5 mg at 08/09/21 0105    senna (SENOKOT) tablet 17 2 mg, 2 tablet, Oral, Daily, Jm Glover MD    thiamine tablet 100 mg, 100 mg, Oral, Daily, Jm Glover MD, 100 mg at 08/09/21 0824     Vitals:   Vitals:    08/09/21 1223   BP: 145/93   Pulse: 89   Resp: 17   Temp: 98 °F (36 7 °C)   SpO2: 93%       Intake/Output:  I/O     None           Nutrition/GI Proph/Bowel Reg:  Regular house  Senokot  Physical Exam:   GENERAL APPEARANCE:  Obvious signs of head trauma  NEURO:  Awake and alert, oriented x3   5/5 strength in the upper and lower extremities  Normal sensation in the upper lower extremities  HEENT:  Pupils equal and reactive  Right-sided forehead/lateral eyebrow hematoma  CV:  Regular rate and rhythm, no murmurs  LUNGS:  CTAB  GI:  Soft, nondistended, nontender  :  Deferred  MSK:  Tenderness to palpation of the right clavicle  Right upper extremity in sling  Tenderness of the right chest wall as well  SKIN:  Warm and dry      Invasive Devices     Peripheral Intravenous Line            Peripheral IV 08/08/21 Left Hand 1 day    Peripheral IV 08/08/21 Right Wrist 1 day                 Lab Results: Results: I have personally reviewed pertinent reports  Imaging/EKG Studies: Results: I have personally reviewed pertinent reports  VTE Prophylaxis: Enoxaparin (Lovenox)           Progress Note - Tertiary Trauma [place]   Ninoska Spear 46 y o  female MRN: [id number]  [facility] Encounter: [id number]    Summary of Diagnosed Injuries:   -right 2 through 7 rib fractures  -posttraumatic atelectasis in the anterior lateral right upper lobe  -right clavicular fracture    Clinical Plan:   -APS consult for multiple right-sided rib fractures  -chest x-ray this morning without any significant pulmonary contusion, no pneumothorax   -rib fracture protocol   -multimodal analgesia  -ortho consult for right clavicle fracture, orthopedics consulted  -PT/OT consulted  -monitor for alcohol withdrawal       Prophylaxis: Enoxaparin (Lovenox)    Disposition:  Continue inpatient care    Code status:  Level 1 - Full Code    Consultants:   APS, PT/OT, orthopedics    Is the patient 72 years or older?: No        SUBJECTIVE:       Tertiary Exam Due on: 8/9/21    Mechanism of Injury:Fall    Details related to Injury: +LOC:  yes    Chief Complaint:  "My right side hurts"    HPI/Last 24 hour events:  Patient was admitted last night as a will be trauma, found to have right-sided rib and clavicle fracture  Remained stable  At presentation, patient was intoxicated but this morning is clinically sober  Remains on room air  Denies any lightheadedness or dizziness, headaches, neck pain      Active medications:           Current Facility-Administered Medications:     acetaminophen (TYLENOL) tablet 975 mg, 975 mg, Oral, Q8H JEFF, 975 mg at 08/09/21 1300    enoxaparin (LOVENOX) subcutaneous injection 30 mg, 30 mg, Subcutaneous, N17C    folic acid (FOLVITE) tablet 1 mg, 1 mg, Oral, Daily, 1 mg at 08/09/21 0824    gabapentin (NEURONTIN) capsule 300 mg, 300 mg, Oral, HS   HYDROmorphone (DILAUDID) injection 0 5 mg, 0 5 mg, Intravenous, Q3H PRN, 0 5 mg at 08/09/21 1254    methocarbamol (ROBAXIN) tablet 500 mg, 500 mg, Oral, Q6H JEFF, 500 mg at 08/09/21 1135    ondansetron (ZOFRAN) injection 4 mg, 4 mg, Intravenous, Q6H PRN    oxyCODONE (ROXICODONE) immediate release tablet 10 mg, 10 mg, Oral, Q4H PRN, 10 mg at 08/09/21 1049    oxyCODONE (ROXICODONE) IR tablet 5 mg, 5 mg, Oral, Q4H PRN, 5 mg at 08/09/21 0105    senna (SENOKOT) tablet 17 2 mg, 2 tablet, Oral, Daily    thiamine tablet 100 mg, 100 mg, Oral, Daily, 100 mg at 08/09/21 0824      OBJECTIVE:     Vitals:   Vitals:    08/09/21 1223   BP: 145/93   Pulse: 89   Resp: 17   Temp: 98 °F (36 7 °C)   SpO2: 93%       Physical Exam:   GENERAL APPEARANCE:  Sitting up in bed, awake  NEURO:  Alert and oriented x3  Normal strength in the upper and lower extremities although right shoulder strength is limited by pain  Normal sensation in the upper and lower extremities  HEENT:  Pupils are equal and reactive  Facial trauma to the right side of the face with cephalhematoma the right forehead  CV:  Regular rate and rhythm  LUNGS:  CTAB  GI:  Soft, nontender  :  Deferred  MSK:  No deformities  Right upper extremity in a sling  SKIN:  Warm and dry  I/O:   I/O     None          Invasive Devices: Invasive Devices     Peripheral Intravenous Line            Peripheral IV 08/08/21 Left Hand 1 day    Peripheral IV 08/08/21 Right Wrist 1 day                  Imaging:   TRAUMA - CT head wo contrast    Result Date: 8/8/2021  Impression: Right periorbital hematoma without displaced fractures, evidence for orbital soft tissue abnormalities, intracranial hemorrhage, or other acute intracranial findings   I personally discussed this study with Dr Monica Troy on 8/8/2021 at 8:08 PM  Workstation performed: [id number]     TRAUMA - CT spine cervical wo contrast    Result Date: 8/8/2021  Impression: No cervical spine fracture or traumatic malalignment  I personally discussed this study with Dr Nona Gibbs on 8/8/2021 at 8:08 PM   Workstation performed: [id number]     TRAUMA - CT chest abdomen pelvis w contrast    Result Date: 8/8/2021  Impression: 1  Acute right second through seventh rib fractures without significant displacement  Question flail segment second through fifth ribs  2   Posttraumatic atelectasis in the anterolateral right upper lobe without cuca contusion or pulmonary laceration  No pneumothorax or hemothorax  3   Nondisplaced acute right clavicular fracture without acromioclavicular separation or sternoclavicular dissociation  4   Skeletal or visceral injuries demonstrated in the abdomen or pelvis  5   Lobular wall thickening of the sigmoid colon  Though appearance could be seen with colitis or diverticulitis, lobulated contour is somewhat suspicious for mass  Recommend correlation for history suggestive of colitis  Recommend further evaluation with colonoscopy  6   Right ovarian dermoid cyst   I personally discussed this study with Dr Nona Gibbs on 8/8/2021 at 8:08 PM  Workstation performed: [id number]     XR Trauma multiple ([facility] trauma bay ONLY)    Result Date: 8/8/2021  Impression: Known right second through seventh acute rib fractures are better demonstrated on recent CT due to nondisplacement  No pneumothorax, hemothorax, or pulmonary contusion demonstrated   Workstation performed: [id number]       Labs:   CBC:   Lab Results   Component Value Date    WBC 8 20 08/09/2021    HGB 12 6 08/09/2021    HCT 37 4 08/09/2021    MCV 94 08/09/2021     08/09/2021    MCH 31 7 08/09/2021    MCHC 33 7 08/09/2021    RDW 17 6 (H) 08/09/2021    MPV 9 8 08/09/2021    NRBC 0 08/08/2021     CMP:   Lab Results   Component Value Date     08/09/2021    CO2 23 08/09/2021    CO2 24 08/08/2021    BUN 9 08/09/2021    CREATININE 0 34 (L) 08/09/2021    GLUCOSE 97 08/08/2021    CALCIUM 7 7 (L) 08/09/2021    EGFR 128 08/09/2021

## 2021-08-09 NOTE — PLAN OF CARE
Problem: Potential for Falls  Goal: Patient will remain free of falls  Description: INTERVENTIONS:  - Educate patient/family on patient safety including physical limitations  - Instruct patient to call for assistance with activity   - Consult OT/PT to assist with strengthening/mobility   - Keep Call bell within reach  - Keep bed low and locked with side rails adjusted as appropriate  - Keep care items and personal belongings within reach  - Initiate and maintain comfort rounds  - Make Fall Risk Sign visible to staff  - Apply yellow socks and bracelet for high fall risk patients  - Consider moving patient to room near nurses station  8/9/2021 1838 by Darell Salinas RN  Outcome: Progressing  8/9/2021 1837 by Darell Salinas RN  Outcome: Progressing     Problem: DISCHARGE PLANNING - CARE MANAGEMENT  Goal: Discharge to post-acute care or home with appropriate resources  Description: INTERVENTIONS:  - Conduct assessment to determine patient/family and health care team treatment goals, and need for post-acute services based on payer coverage, community resources, and patient preferences, and barriers to discharge  - Address psychosocial, clinical, and financial barriers to discharge as identified in assessment in conjunction with the patient/family and health care team  - Arrange appropriate level of post-acute services according to patients   needs and preference and payer coverage in collaboration with the physician and health care team  - Communicate with and update the patient/family, physician, and health care team regarding progress on the discharge plan  - Arrange appropriate transportation to post-acute venues  8/9/2021 1838 by Darell Salinas RN  Outcome: Progressing  8/9/2021 1837 by Darell Salinas RN  Outcome: Progressing     Problem: PAIN - ADULT  Goal: Verbalizes/displays adequate comfort level or baseline comfort level  Description: Interventions:  - Encourage patient to monitor pain and request assistance  - Assess pain using appropriate pain scale  - Administer analgesics based on type and severity of pain and evaluate response  - Implement non-pharmacological measures as appropriate and evaluate response  - Consider cultural and social influences on pain and pain management  - Notify physician/advanced practitioner if interventions unsuccessful or patient reports new pain  Outcome: Progressing     Problem: Knowledge Deficit  Goal: Patient/family/caregiver demonstrates understanding of disease process, treatment plan, medications, and discharge instructions  Description: Complete learning assessment and assess knowledge base    Interventions:  - Provide teaching at level of understanding  - Provide teaching via preferred learning methods  Outcome: Progressing     Problem: SKIN/TISSUE INTEGRITY - ADULT  Goal: Incision(s), wounds(s) or drain site(s) healing without S/S of infection  Description: INTERVENTIONS  - Assess and document dressing, incision, wound bed, drain sites and surrounding tissue  - Provide patient and family education  Outcome: Progressing     Problem: MUSCULOSKELETAL - ADULT  Goal: Maintain or return mobility to safest level of function  Description: INTERVENTIONS:  - Assess patient's ability to carry out ADLs; assess patient's baseline for ADL function and identify physical deficits which impact ability to perform ADLs (bathing, care of mouth/teeth, toileting, grooming, dressing, etc )  - Assess/evaluate cause of self-care deficits   - Assess range of motion  - Assess patient's mobility  - Assess patient's need for assistive devices and provide as appropriate  - Encourage maximum independence but intervene and supervise when necessary  - Involve family in performance of ADLs  - Assess for home care needs following discharge   - Consider OT consult to assist with ADL evaluation and planning for discharge  - Provide patient education as appropriate  Outcome: Progressing

## 2021-08-09 NOTE — ASSESSMENT & PLAN NOTE
-right 2 through 7 rib fractures, questionable flail segment at 2 through 5  -initially mildly hypoxic in the Trauma [place] to the low 90s, currently saturating well on room air  -APS consulted  -pain control, multimodal pain regimen   -no hemo or pneumothorax but does have posttraumatic atelectasis in the anterior lateral right upper lobe  -will obtain a follow-up chest x-ray tomorrow

## 2021-08-09 NOTE — ASSESSMENT & PLAN NOTE
-patient will be admitted to step-down to given her acute alcohol intoxication with waxing and waning GCS with multiple rib fractures  -thymine and folate, CIWA protocol  -patient endorsing suicidal thoughts today per EMS, will need to re-evaluate when patient is sober

## 2021-08-09 NOTE — CONSULTS
Consultation- Orthopedics   Ninoska Spear 46 y o  female MRN: [id number]  Unit/Bed#: X ray      Chief Complaint:   right shoulder pain    HPI:   46 y  o female status post fall while intoxicated complaining of right shoulder pain  The police heard her fall down approximately 12 steps yesterday after they knocked on her front door for a wellness check  Positive head strike with unknown LOC  She presented as a level B trauma and was found to have right sided 2nd -7th rib fractures and right periorbital ecchymosis  She denies numbness or tingling to the right upper extremity  PMH significant for HTN  She is right hand dominate and enjoys sewing as a hobby  Smokes 1 PPD of cigarettes and dinks a pint of vodka on a regular basis  Review Of Systems:   · Skin: Normal  · Neuro: See HPI  · Musculoskeletal: See HPI  · 14 point review of systems negative except as stated above     Past Medical History:   No past medical history on file  Past Surgical History:   No past surgical history on file  Family History:  Family history reviewed and non-contributory  No family history on file  Social History:  Social History     Socioeconomic History    Marital status: /Civil Union     Spouse name: Not on file    Number of children: Not on file    Years of education: Not on file    Highest education level: Not on file   Occupational History    Not on file   Tobacco Use    Smoking status: Not on file   Substance and Sexual Activity    Alcohol use: Not on file    Drug use: Not on file    Sexual activity: Not on file   Other Topics Concern    Not on file   Social History Narrative    Not on file     Social Determinants of Health     Financial Resource Strain:     Difficulty of Paying Living Expenses:    Food Insecurity:     Worried About Running Out of Food in the Last Year:     [address] in the Last Year:    Transportation Needs:     Lack of Transportation (Medical):      Lack of Transportation (Non-Medical):    Physical Activity:     Days of Exercise per Week:     Minutes of Exercise per Session:    Stress:     Feeling of Stress :    Social Connections:     Frequency of Communication with Friends and Family:     Frequency of Social Gatherings with Friends and Family:     Attends Sabianist Services:     Active Member of Clubs or Organizations:     Attends Club or Organization Meetings:     Marital Status:    Intimate Partner Violence:     Fear of Current or Ex-Partner:     Emotionally Abused:     Physically Abused:     Sexually Abused:         Allergies:   Not on File        Labs:  0   Lab Value Date/Time    HCT 37 4 08/09/2021 0534    HCT 40 9 08/08/2021 1922    HCT 39 08/08/2021 1922    HGB 12 6 08/09/2021 0534    HGB 14 0 08/08/2021 1922    HGB 13 3 08/08/2021 1922    INR 0 87 08/08/2021 1922    WBC 8 20 08/09/2021 0534    WBC 7 21 08/08/2021 1922       Meds:    Current Facility-Administered Medications:     acetaminophen (TYLENOL) tablet 975 mg, 975 mg, Oral, Q8H [address], Opal Glover MD, 975 mg at 08/09/21 0525    enoxaparin (LOVENOX) subcutaneous injection 30 mg, 30 mg, Subcutaneous, Q12H, Rubia Glover MD    folic acid (FOLVITE) tablet 1 mg, 1 mg, Oral, Daily, Opal Glover MD, 1 mg at 08/09/21 0824    gabapentin (NEURONTIN) capsule 300 mg, 300 mg, Oral, HS, Opal Glover MD    HYDROmorphone (DILAUDID) injection 0 5 mg, 0 5 mg, Intravenous, Q3H PRN, Guadalupe Dunn MD, 0 5 mg at 08/09/21 0820    methocarbamol (ROBAXIN) tablet 500 mg, 500 mg, Oral, Q6H [address], Guadalupe Dunn MD, 500 mg at 08/09/21 0525    ondansetron (ZOFRAN) injection 4 mg, 4 mg, Intravenous, Q6H PRN, Opal Glover MD    oxyCODONE (ROXICODONE) immediate release tablet 10 mg, 10 mg, Oral, Q4H PRN, Opal Glover MD, 10 mg at 08/09/21 0525    oxyCODONE (ROXICODONE) IR tablet 5 mg, 5 mg, Oral, Q4H PRN, Opal Glover MD, 5 mg at 08/09/21 0105    senna (SENOKOT) tablet 17 2 mg, 2 tablet, Oral, Daily, Rubia Glover, MD    thiamine tablet 100 mg, 100 mg, Oral, Daily, Bozena Glover MD, 100 mg at 08/09/21 0824  No current outpatient medications on file  Blood Culture:   No results found for: BLOODCX    Wound Culture:   No results found for: WOUNDCULT    Ins and Outs:  No intake/output data recorded  Physical Exam:   /71   Pulse 81   Temp 97 6 °F (36 4 °C) (Rectal)   Resp 14   Wt 79 6 kg (175 lb 7 8 oz)   SpO2 97%   Gen: Alert but slow to respond   HEENT: EOMI, eyes clear, moist mucus membranes, hearing intact  Respiratory: Bilateral chest rise  No audible wheezing found  Cardiovascular: Regular Rate and Rhythm  Abdomen: soft nontender/nondistended  Musculoskeletal: right upper extremity  · Skin intact, swelling noted over shoulder  No tenting  · Tender to palpation over clavicle  · Sensation intact to axillary, median, radial, and ulnar nerve distributions  · Motor intact to median, radial, and ulnar nerve distributions  · 2+ radial pulse    Tertiary: no deformity or crepitus with palpation or range of motion in extremities except stated above    Radiology:   I personally reviewed the films  X-rays right clavicle shows minimally displaced middle 1/3 clavicle shaft fracture    _*_*_*_*_*_*_*_*_*_*_*_*_*_*_*_*_*_*_*_*_*_*_*_*_*_*_*_*_*_*_*_*_*_*_*_*_*_*_*_*_*    Assessment:  46 y  o female S/P fall while intoxicated with right clavicle fracture    Plan:   · NWB right upper extremity in sling  · Analgesics for pain per primary   · PT/OT   · Nonoperative management   · Follow up in 2 weeks with Dr Eloy Leo   · Dispo: Ortho signing off    Case reviewed and discussed with senior resident    Kate Muniz MD
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
## 2021-08-09 NOTE — ASSESSMENT & PLAN NOTE
-found to have lobular wall thickening of the sigmoid colon, colitis versus mass  -will likely need colonoscopy referral to rule out cancer  -unable to inform patient about incidental findings at this time due to gross alcohol intoxication

## 2021-08-10 LAB
ANION GAP SERPL CALCULATED.3IONS-SCNC: 2 MMOL/L (ref 4–13)
BASOPHILS # BLD AUTO: 0.02 THOUSANDS/ΜL (ref 0–0.1)
BASOPHILS NFR BLD AUTO: 0 % (ref 0–1)
BUN SERPL-MCNC: 12 MG/DL (ref 5–25)
CALCIUM SERPL-MCNC: 8.4 MG/DL (ref 8.3–10.1)
CHLORIDE SERPL-SCNC: 101 MMOL/L (ref 100–108)
CO2 SERPL-SCNC: 30 MMOL/L (ref 21–32)
CREAT SERPL-MCNC: 0.33 MG/DL (ref 0.6–1.3)
EOSINOPHIL # BLD AUTO: 0.07 THOUSAND/ΜL (ref 0–0.61)
EOSINOPHIL NFR BLD AUTO: 1 % (ref 0–6)
ERYTHROCYTE [DISTWIDTH] IN BLOOD BY AUTOMATED COUNT: 17.2 % (ref 11.6–15.1)
GFR SERPL CREATININE-BSD FRML MDRD: 129 ML/MIN/1.73SQ M
GLUCOSE SERPL-MCNC: 89 MG/DL (ref 65–140)
HCT VFR BLD AUTO: 37.7 % (ref 34.8–46.1)
HGB BLD-MCNC: 12.5 G/DL (ref 11.5–15.4)
IMM GRANULOCYTES # BLD AUTO: 0.02 THOUSAND/UL (ref 0–0.2)
IMM GRANULOCYTES NFR BLD AUTO: 0 % (ref 0–2)
LYMPHOCYTES # BLD AUTO: 2.01 THOUSANDS/ΜL (ref 0.6–4.47)
LYMPHOCYTES NFR BLD AUTO: 28 % (ref 14–44)
MCH RBC QN AUTO: 31.9 PG (ref 26.8–34.3)
MCHC RBC AUTO-ENTMCNC: 33.2 G/DL (ref 31.4–37.4)
MCV RBC AUTO: 96 FL (ref 82–98)
MONOCYTES # BLD AUTO: 0.61 THOUSAND/ΜL (ref 0.17–1.22)
MONOCYTES NFR BLD AUTO: 8 % (ref 4–12)
NEUTROPHILS # BLD AUTO: 4.59 THOUSANDS/ΜL (ref 1.85–7.62)
NEUTS SEG NFR BLD AUTO: 63 % (ref 43–75)
NRBC BLD AUTO-RTO: 0 /100 WBCS
PLATELET # BLD AUTO: 223 THOUSANDS/UL (ref 149–390)
PMV BLD AUTO: 10.7 FL (ref 8.9–12.7)
POTASSIUM SERPL-SCNC: 3.6 MMOL/L (ref 3.5–5.3)
RBC # BLD AUTO: 3.92 MILLION/UL (ref 3.81–5.12)
SODIUM SERPL-SCNC: 133 MMOL/L (ref 136–145)
WBC # BLD AUTO: 7.32 THOUSAND/UL (ref 4.31–10.16)

## 2021-08-10 PROCEDURE — 99232 SBSQ HOSP IP/OBS MODERATE 35: CPT | Performed by: EMERGENCY MEDICINE

## 2021-08-10 PROCEDURE — 97163 PT EVAL HIGH COMPLEX 45 MIN: CPT

## 2021-08-10 PROCEDURE — 80048 BASIC METABOLIC PNL TOTAL CA: CPT | Performed by: SURGERY

## 2021-08-10 PROCEDURE — 85025 COMPLETE CBC W/AUTO DIFF WBC: CPT | Performed by: SURGERY

## 2021-08-10 PROCEDURE — 97167 OT EVAL HIGH COMPLEX 60 MIN: CPT

## 2021-08-10 PROCEDURE — NC001 PR NO CHARGE: Performed by: ORTHOPAEDIC SURGERY

## 2021-08-10 RX ORDER — LORAZEPAM 0.5 MG/1
0.5 TABLET ORAL EVERY 6 HOURS PRN
Status: DISCONTINUED | OUTPATIENT
Start: 2021-08-10 | End: 2021-08-26 | Stop reason: HOSPADM

## 2021-08-10 RX ADMIN — FOLIC ACID 1 MG: 1 TABLET ORAL at 09:08

## 2021-08-10 RX ADMIN — LORAZEPAM 0.5 MG: 0.5 TABLET ORAL at 10:21

## 2021-08-10 RX ADMIN — SENNOSIDES 17.2 MG: 8.6 TABLET ORAL at 09:08

## 2021-08-10 RX ADMIN — ENOXAPARIN SODIUM 30 MG: 30 INJECTION SUBCUTANEOUS at 21:24

## 2021-08-10 RX ADMIN — METHOCARBAMOL 500 MG: 500 TABLET, FILM COATED ORAL at 17:47

## 2021-08-10 RX ADMIN — LORAZEPAM 0.5 MG: 0.5 TABLET ORAL at 16:33

## 2021-08-10 RX ADMIN — ACETAMINOPHEN 975 MG: 325 TABLET, FILM COATED ORAL at 21:23

## 2021-08-10 RX ADMIN — ACETAMINOPHEN 975 MG: 325 TABLET, FILM COATED ORAL at 05:43

## 2021-08-10 RX ADMIN — ENOXAPARIN SODIUM 30 MG: 30 INJECTION SUBCUTANEOUS at 09:08

## 2021-08-10 RX ADMIN — OXYCODONE HYDROCHLORIDE 10 MG: 10 TABLET ORAL at 03:11

## 2021-08-10 RX ADMIN — OXYCODONE HYDROCHLORIDE 10 MG: 10 TABLET ORAL at 09:08

## 2021-08-10 RX ADMIN — OXYCODONE HYDROCHLORIDE 10 MG: 10 TABLET ORAL at 17:47

## 2021-08-10 RX ADMIN — OXYCODONE HYDROCHLORIDE 10 MG: 10 TABLET ORAL at 13:35

## 2021-08-10 RX ADMIN — METHOCARBAMOL 500 MG: 500 TABLET, FILM COATED ORAL at 05:43

## 2021-08-10 RX ADMIN — THIAMINE HCL TAB 100 MG 100 MG: 100 TAB at 09:08

## 2021-08-10 RX ADMIN — ACETAMINOPHEN 975 MG: 325 TABLET, FILM COATED ORAL at 13:34

## 2021-08-10 RX ADMIN — HYDROMORPHONE HYDROCHLORIDE 0.5 MG: 1 INJECTION, SOLUTION INTRAMUSCULAR; INTRAVENOUS; SUBCUTANEOUS at 21:23

## 2021-08-10 RX ADMIN — GABAPENTIN 300 MG: 300 CAPSULE ORAL at 21:23

## 2021-08-10 RX ADMIN — METHOCARBAMOL 500 MG: 500 TABLET, FILM COATED ORAL at 12:36

## 2021-08-10 NOTE — PROGRESS NOTES
Pastoral Care Progress Note    8/10/2021  Patient: Nathaniel Rossi : 1970  Admission Date & Time: 2021 1915  MRN: [id number] [facility]: [id number]                     Chaplaincy Interventions Utilized:   Empowerment: Encouraged focus on present, Encouraged self-care and Provided chaplaincy education    Exploration: Explored hope, Explored emotional needs & resources and Explored spiritual needs & resources    Relationship Building: Cultivated a relationship of care and support    Chaplaincy Outcomes Achieved:  Declined  support       08/10/21 1400   Clinical Encounter Type   Visited With Patient   Routine Visit Introduction

## 2021-08-10 NOTE — ASSESSMENT & PLAN NOTE
-right 2 through 7 rib fractures, questionable flail segment at 2 through 5  -initially mildly hypoxic in the Trauma [place] to the low 90s, currently saturating well on room air  -APS consulted  -pain control, multimodal pain regimen   -no hemo or pneumothorax but does have posttraumatic atelectasis in the anterior lateral right upper lobe  -will obtain a follow-up chest x-ray tomorrow, done - results: No pneumothorax or hemothorax    Right rib and right clavicle fracture
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
## 2021-08-10 NOTE — PROGRESS NOTES
Progress Note - Orthopedics   Ninoska Spear 46 y o  female MRN: [id number]  [facility]      Subjective:    46 y  o female s/p fall while intoxicated with right clavicle fracture  Pt doing well  Pain controlled  Denies fevers chills, CP, SOB      Labs:  0   Lab Value Date/Time    HCT 37 7 08/10/2021 0449    HCT 37 4 08/09/2021 0534    HCT 40 9 08/08/2021 1922    HCT 39 08/08/2021 1922    HGB 12 5 08/10/2021 0449    HGB 12 6 08/09/2021 0534    HGB 14 0 08/08/2021 1922    HGB 13 3 08/08/2021 1922    INR 0 87 08/08/2021 1922    WBC 7 32 08/10/2021 0449    WBC 8 20 08/09/2021 0534    WBC 7 21 08/08/2021 1922       Meds:    Current Facility-Administered Medications:     acetaminophen (TYLENOL) tablet 975 mg, 975 mg, Oral, Q8H [facility], Duncan Glover MD, 975 mg at 08/10/21 0543    bupivacaine liposomal (EXPAREL) 1 3 % injection 20 mL, 20 mL, Infiltration, Once, Selina Whitten MD    enoxaparin (LOVENOX) subcutaneous injection 30 mg, 30 mg, Subcutaneous, Q12H, Rubia Glover MD, 30 mg at 68/46/57 7576    folic acid (FOLVITE) tablet 1 mg, 1 mg, Oral, Daily, Duncan Glover MD, 1 mg at 08/09/21 0824    gabapentin (NEURONTIN) capsule 300 mg, 300 mg, Oral, HS, Duncan Glover MD, 300 mg at 08/09/21 2104    HYDROmorphone (DILAUDID) injection 0 5 mg, 0 5 mg, Intravenous, Q3H PRN, Mayte Fernandez MD, 0 5 mg at 08/09/21 1254    methocarbamol (ROBAXIN) tablet 500 mg, 500 mg, Oral, Q6H [facility], Mayte Fernandez MD, 500 mg at 08/10/21 0543    ondansetron (ZOFRAN) injection 4 mg, 4 mg, Intravenous, Q6H PRN, Duncan Glover MD    oxyCODONE (ROXICODONE) immediate release tablet 10 mg, 10 mg, Oral, Q4H PRN, Duncan Glover MD, 10 mg at 08/10/21 0311    oxyCODONE (ROXICODONE) IR tablet 5 mg, 5 mg, Oral, Q4H PRN, Duncan Glover MD, 5 mg at 08/09/21 0105    senna (SENOKOT) tablet 17 2 mg, 2 tablet, Oral, Daily, Duncan Glover MD    thiamine tablet 100 mg, 100 mg, Oral, Daily, Duncan Glover MD, 100 mg at 08/09/21 0824    Blood Culture:   No results found for: BLOODCX    Wound Culture:   No results found for: WOUNDCULT    Ins and Outs:  I/O last 24 hours: In: 36 [P O :820]  Out: 1250 [Urine:1250]          Physical:  Vitals:    08/10/21 0314   BP: (!) 147/104   Pulse: 77   Resp:    Temp: 98 °F (36 7 °C)   SpO2: 94%     Musculoskeletal: left Upper Extremity  · Skin intact, swelling over middle third clavicle  · TTP over clavicle   · SILT axillary/musculocutaneous/m/r/u   · Motor intact axillary/musculocutaneous/ain/pin/m/r/u   · 2+ radial pulse    Assessment:    46 y  o female s/p fall with right clavicle fracture  Plan is for non-operative treatment in a sling        Plan:  · NWB RUE in sling   · PT/OT  · Pain control per primary   · Follow up in 2 weeks with Dr Carmell Spatz   · Dispo: Ortho signing off    Jolie Arevalo MD

## 2021-08-10 NOTE — PROGRESS NOTES
[facility]  Progress Note - Verónica Sandoval 1970, 46 y o  female MRN: [id number]  Unit/Bed#: [facility] 622-01 Encounter: [id number]  Primary Care Provider: Peggy Ogden DO   Date and time admitted to hospital: 8/8/2021  7:15 PM    Colon abnormality  Assessment & Plan  -found to have lobular wall thickening of the sigmoid colon, colitis versus mass  -will likely need colonoscopy referral to rule out cancer  -unable to inform patient about incidental findings at this time due to gross alcohol intoxication  -review of incidental findings    Right clavicle fracture  Assessment & Plan  -placed in sling   - NWB    Alcohol intoxication ([address] )  Assessment & Plan  -patient will be admitted to step-down to given her acute alcohol intoxication with waxing and waning GCS with multiple rib fractures  -thymine and folate, CIWA protocol  -patient endorsing suicidal thoughts today per EMS, will need to re-evaluate when patient is sober  - Patient now sober  Asked about suicidal ideation and she clearly without any hesitation stated absolutely not  States her  is a  and comes home for the weekend  Stops at home then goes stays with his girlfriend  This has been the situation for some time  She denies suicidal ideations, denies any threats from  or being afraid of him  * Fracture of multiple ribs of right side  Assessment & Plan  -right 2 through 7 rib fractures, questionable flail segment at 2 through 5  -initially mildly hypoxic in the [facility] [place] to the low 90s, currently saturating well on room air  -APS consulted  -pain control, multimodal pain regimen   -no hemo or pneumothorax but does have posttraumatic atelectasis in the anterior lateral right upper lobe  -will obtain a follow-up chest x-ray tomorrow, done - results: No pneumothorax or hemothorax    Right rib and right clavicle fracture           Disposition: home      SUBJECTIVE:  Chief Complaint: rib pain    Subjective: I feel a little better  OBJECTIVE:     Meds/Allergies     Current Facility-Administered Medications:     acetaminophen (TYLENOL) tablet 975 mg, 975 mg, Oral, Q8H [facility], Jm Glover MD, 975 mg at 08/10/21 0543    bupivacaine liposomal (EXPAREL) 1 3 % injection 20 mL, 20 mL, Infiltration, Once, Aziza Crowell MD    enoxaparin (LOVENOX) subcutaneous injection 30 mg, 30 mg, Subcutaneous, Q12H, Rubia Glover MD, 30 mg at 82/08/52 4482    folic acid (FOLVITE) tablet 1 mg, 1 mg, Oral, Daily, Rubia Glover MD, 1 mg at 08/10/21 0908    gabapentin (NEURONTIN) capsule 300 mg, 300 mg, Oral, HS, Jm Glover MD, 300 mg at 08/09/21 2104    HYDROmorphone (DILAUDID) injection 0 5 mg, 0 5 mg, Intravenous, Q3H PRN, Madeline Corcoran MD, 0 5 mg at 08/09/21 1254    LORazepam (ATIVAN) tablet 0 5 mg, 0 5 mg, Oral, Q6H PRN, EROS Shaikh, 0 5 mg at 08/10/21 1021    methocarbamol (ROBAXIN) tablet 500 mg, 500 mg, Oral, Q6H [facility], Madeline Corcoran MD, 500 mg at 08/10/21 0543    ondansetron (ZOFRAN) injection 4 mg, 4 mg, Intravenous, Q6H PRN, Jm Glover MD    oxyCODONE (ROXICODONE) immediate release tablet 10 mg, 10 mg, Oral, Q4H PRN, Jm Glover MD, 10 mg at 08/10/21 0908    oxyCODONE (ROXICODONE) IR tablet 5 mg, 5 mg, Oral, Q4H PRN, Jm Glover MD, 5 mg at 08/09/21 0105    senna (SENOKOT) tablet 17 2 mg, 2 tablet, Oral, Daily, Jm Glover MD, 17 2 mg at 08/10/21 0908    thiamine tablet 100 mg, 100 mg, Oral, Daily, Jm Glover MD, 100 mg at 08/10/21 0908     Vitals:   Vitals:    08/10/21 1109   BP: 155/91   Pulse: 81   Resp: 17   Temp: 98 °F (36 7 °C)   SpO2: 97%       Intake/Output:  I/O       08/08 0701 - 08/09 0700 08/09 0701 - 08/10 0700 08/10 0701 - 08/11 0700    P  O   820 360    Total Intake(mL/kg)  820 (9 9) 360 (4 3)    Urine (mL/kg/hr)  1250 (0 6) 1050 (2 3)    Stool   0    Total Output  1250 1050    Net  -430 -690           Unmeasured Urine Occurrence  1 x 2 x Unmeasured Stool Occurrence   0 x           Nutrition/GI Proph/Bowel Reg: regular    Physical Exam:   GENERAL APPEARANCE: comfortable, OOB to a chair  NEURO: intact, GCS - 15  HEENT: EOm's intact  CV: RRR< no complaint of chest pain  LUNGS: CTA bilaterally, no shortness of breath  GI: tolerating a diet  : voiding  MSK: moves, extremities  SKIN: warm and dry    Invasive Devices     Peripheral Intravenous Line            Peripheral IV 08/08/21 Left Hand 2 days    Peripheral IV 08/08/21 Right Wrist 2 days                 Lab Results: Results for Chanel Jacob (MRN [id number]) as of 8/10/2021 12:37   Ref   Range 8/10/2021 04:49   Sodium Latest Ref Range: 136 - 145 mmol/L 133 (L)   Potassium Latest Ref Range: 3 5 - 5 3 mmol/L 3 6   Chloride Latest Ref Range: 100 - 108 mmol/L 101   CO2 Latest Ref Range: 21 - 32 mmol/L 30   Anion Gap Latest Ref Range: 4 - 13 mmol/L 2 (L)   BUN Latest Ref Range: 5 - 25 mg/dL 12   Creatinine Latest Ref Range: 0 60 - 1 30 mg/dL 0 33 (L)   Glucose, Random Latest Ref Range: 65 - 140 mg/dL 89   Calcium Latest Ref Range: 8 3 - 10 1 mg/dL 8 4   eGFR Latest Units: ml/min/1 73sq m 129   WBC Latest Ref Range: 4 31 - 10 16 Thousand/uL 7 32   Red Blood Cell Count Latest Ref Range: 3 81 - 5 12 Million/uL 3 92   Hemoglobin Latest Ref Range: 11 5 - 15 4 g/dL 12 5   HCT Latest Ref Range: 34 8 - 46 1 % 37 7   MCV Latest Ref Range: 82 - 98 fL 96   MCH Latest Ref Range: 26 8 - 34 3 pg 31 9   MCHC Latest Ref Range: 31 4 - 37 4 g/dL 33 2   RDW Latest Ref Range: 11 6 - 15 1 % 17 2 (H)   Platelet Count Latest Ref Range: 149 - 390 Thousands/uL 223   MPV Latest Ref Range: 8 9 - 12 7 fL 10 7   nRBC Latest Units: /100 WBCs 0   Neutrophils % Latest Ref Range: 43 - 75 % 63   Immat GRANS % Latest Ref Range: 0 - 2 % 0   Lymphocytes Relative Latest Ref Range: 14 - 44 % 28   Monocytes Relative Latest Ref Range: 4 - 12 % 8   Eosinophils Latest Ref Range: 0 - 6 % 1   Basophils Relative Latest Ref Range: 0 - 1 % 0 Immature Grans Absolute Latest Ref Range: 0 00 - 0 20 Thousand/uL 0 02   Absolute Neutrophils Latest Ref Range: 1 85 - 7 62 Thousands/µL 4 59   Lymphocytes Absolute Latest Ref Range: 0 60 - 4 47 Thousands/µL 2 01   Absolute Monocytes Latest Ref Range: 0 17 - 1 22 Thousand/µL 0 61   Absolute Eosinophils Latest Ref Range: 0 00 - 0 61 Thousand/µL 0 07   Basophils Absolute Latest Ref Range: 0 00 - 0 10 Thousands/µL 0 02     Imaging/EKG Studies: none  Other Studies: none  VTE Prophylaxis: Sequential compression device (Venodyne)  and Enoxaparin (Lovenox)
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
## 2021-08-10 NOTE — ASSESSMENT & PLAN NOTE
-found to have lobular wall thickening of the sigmoid colon, colitis versus mass  -will likely need colonoscopy referral to rule out cancer  -unable to inform patient about incidental findings at this time due to gross alcohol intoxication  -review of incidental findings

## 2021-08-10 NOTE — RESPIRATORY THERAPY NOTE
08/10/21 1300   Respiratory Protocol   Protocol Initiated? Yes   Protocol Selection Airway Clearance   Language Barrier? No   Medical & Social History Reviewed? Yes   Diagnostic Studies Reviewed? Yes   Physical Assessment Performed? Yes   Airway Clearance Plan Discontinue Protocol   Respiratory Assessment   Resp Comments Pt able to reach IS goal  Will continue with IS therapy independently  I will d/c ACP
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
## 2021-08-10 NOTE — PROGRESS NOTES
Progress Note - Acute Pain Service    Alec Cowan 46 y o  female MRN: [id number]  Unit/Bed#: [facility] 622-01 Encounter: [id number]      Assessment:   Principal Problem:    Fracture of multiple ribs of right side  Active Problems:    Alcohol intoxication ([address] )    Right clavicle fracture    Colon abnormality    Alec Cowan is a 46 y o  female  with an acute right clavicular fracture- non op- and multiple right rib fractures in the setting of a fall down the stairs while intoxicated  She was offered an IS block to assist with her clavicle fracture however ultimately decided it was not a treatment modality she wanted to pursue  She feels like the IV pain medication helps her the most but the oxy does bring down her pain  Discussed with pt that the IV dilaudid will be weaned off as she approaches discharge  Denies other drug usage and no scripts in PDMP  As far as her rib fractures, shes satting well on RA, can achieve >1500 on IS  No indication for epidural at this time  Plan:   - standard oral opioid regimen with oxycodone 5 mg/10 mg PO q4hrs PRN for moderate/severe pain,   - Decrease frequency of Dilaudid 0 5 mg IV to q6hrrs PRN for breakthrough pain  D/C tomorrow  - Continue scheduled tylenol, usnny, robaxin  - Consider short course of scheduled NSAIDs      - Senna 1 tablet PO qhs    APS will sign off at this time  Thank you for the consult  All opioids and other analgesics to be written at discretion of primary team  Please contact Acute Pain Service - [facility] via eReceipts from [phone] with additional questions or concerns  See Vaughn or Josselin for additional contacts and after hours information      Pain History  Current pain location(s): right clavicle  Pain Scale:   7/10  Quality: sharp  24 hour history: As above, no CP, SOB, n/v    Opioid requirement previous 24 hours: 1mg IV dilaudid, oxycodone 40mg    Meds/Allergies   current meds:   Current Facility-Administered Medications   Medication Dose Route Frequency    acetaminophen (TYLENOL) tablet 975 mg  975 mg Oral Q8H [address]    enoxaparin (LOVENOX) subcutaneous injection 30 mg  30 mg Subcutaneous P04Z    folic acid (FOLVITE) tablet 1 mg  1 mg Oral Daily    gabapentin (NEURONTIN) capsule 300 mg  300 mg Oral HS    HYDROmorphone (DILAUDID) injection 0 5 mg  0 5 mg Intravenous Q3H PRN    LORazepam (ATIVAN) tablet 0 5 mg  0 5 mg Oral Q6H PRN    methocarbamol (ROBAXIN) tablet 500 mg  500 mg Oral Q6H [address]    ondansetron (ZOFRAN) injection 4 mg  4 mg Intravenous Q6H PRN    oxyCODONE (ROXICODONE) immediate release tablet 10 mg  10 mg Oral Q4H PRN    oxyCODONE (ROXICODONE) IR tablet 5 mg  5 mg Oral Q4H PRN    senna (SENOKOT) tablet 17 2 mg  2 tablet Oral Daily    thiamine tablet 100 mg  100 mg Oral Daily       No Known Allergies    Objective     Temp:  [97 6 °F (36 4 °C)-98 4 °F (36 9 °C)] 98 °F (36 7 °C)  HR:  [75-90] 81  Resp:  [16-19] 17  BP: (138-172)/() 155/91    Physical Exam    Lab Results:   Results from last 7 days   Lab Units 08/10/21  0449   WBC Thousand/uL 7 32   HEMOGLOBIN g/dL 12 5   HEMATOCRIT % 37 7   PLATELETS Thousands/uL 223      Results from last 7 days   Lab Units 08/10/21  0449 08/08/21  1922   POTASSIUM mmol/L 3 6 3 8   CHLORIDE mmol/L 101 113*   CO2 mmol/L 30 28   CO2, I-STAT mmol/L  --  24   BUN mg/dL 12 9   CREATININE mg/dL 0 33* 0 47*   CALCIUM mg/dL 8 4 8 3   GLUCOSE, ISTAT mg/dl  --  97       Imaging Studies: I have personally reviewed pertinent reports  EKG, Pathology, and Other Studies: I have personally reviewed pertinent reports  Counseling / Coordination of Care  Total floor / unit time spent today 20 minutes  Greater than 50% of total time was spent with the patient and / or family counseling and / or coordination of care   A description of the counseling / coordination of care: focused H&P, pt exam, discussion of pain regimen with pt and options, communication with pt's nurse and primary team    Please note that the APS provides consultative services regarding pain management only  With the exception of ketamine and epidural infusions and except when indicated, final decisions regarding starting or changing doses of analgesic medications are at the discretion of the consulting service  Off hours consultation and/or medication management is generally not available      Mj Chaney MD  Acute Pain Service

## 2021-08-10 NOTE — PROGRESS NOTES
Trauma Res Rosanne notified pt with elevated Sbp 170/98, pt reports should be on 10 mg lisinopril at home but has not been taking 2/0 to insurance coverage  Trauma Res to enter orders

## 2021-08-11 PROCEDURE — 99232 SBSQ HOSP IP/OBS MODERATE 35: CPT | Performed by: EMERGENCY MEDICINE

## 2021-08-11 RX ORDER — LISINOPRIL 10 MG/1
10 TABLET ORAL DAILY
Status: DISCONTINUED | OUTPATIENT
Start: 2021-08-11 | End: 2021-08-26 | Stop reason: HOSPADM

## 2021-08-11 RX ORDER — ESCITALOPRAM OXALATE 10 MG/1
10 TABLET ORAL DAILY
Status: DISCONTINUED | OUTPATIENT
Start: 2021-08-11 | End: 2021-08-26 | Stop reason: HOSPADM

## 2021-08-11 RX ADMIN — HYDROMORPHONE HYDROCHLORIDE 0.5 MG: 1 INJECTION, SOLUTION INTRAMUSCULAR; INTRAVENOUS; SUBCUTANEOUS at 16:23

## 2021-08-11 RX ADMIN — HYDROMORPHONE HYDROCHLORIDE 0.5 MG: 1 INJECTION, SOLUTION INTRAMUSCULAR; INTRAVENOUS; SUBCUTANEOUS at 20:19

## 2021-08-11 RX ADMIN — ENOXAPARIN SODIUM 30 MG: 30 INJECTION SUBCUTANEOUS at 20:19

## 2021-08-11 RX ADMIN — METHOCARBAMOL 500 MG: 500 TABLET, FILM COATED ORAL at 01:06

## 2021-08-11 RX ADMIN — OXYCODONE HYDROCHLORIDE 10 MG: 10 TABLET ORAL at 14:08

## 2021-08-11 RX ADMIN — METHOCARBAMOL 500 MG: 500 TABLET, FILM COATED ORAL at 12:20

## 2021-08-11 RX ADMIN — ENOXAPARIN SODIUM 30 MG: 30 INJECTION SUBCUTANEOUS at 09:30

## 2021-08-11 RX ADMIN — ESCITALOPRAM OXALATE 10 MG: 10 TABLET ORAL at 14:07

## 2021-08-11 RX ADMIN — SENNOSIDES 17.2 MG: 8.6 TABLET ORAL at 09:30

## 2021-08-11 RX ADMIN — FOLIC ACID 1 MG: 1 TABLET ORAL at 09:30

## 2021-08-11 RX ADMIN — OXYCODONE HYDROCHLORIDE 10 MG: 10 TABLET ORAL at 18:20

## 2021-08-11 RX ADMIN — LORAZEPAM 0.5 MG: 0.5 TABLET ORAL at 12:20

## 2021-08-11 RX ADMIN — LISINOPRIL 10 MG: 10 TABLET ORAL at 14:08

## 2021-08-11 RX ADMIN — GABAPENTIN 300 MG: 300 CAPSULE ORAL at 22:22

## 2021-08-11 RX ADMIN — METHOCARBAMOL 500 MG: 500 TABLET, FILM COATED ORAL at 05:13

## 2021-08-11 RX ADMIN — OXYCODONE HYDROCHLORIDE 10 MG: 10 TABLET ORAL at 22:21

## 2021-08-11 RX ADMIN — OXYCODONE HYDROCHLORIDE 10 MG: 10 TABLET ORAL at 09:30

## 2021-08-11 RX ADMIN — OXYCODONE HYDROCHLORIDE 10 MG: 10 TABLET ORAL at 01:06

## 2021-08-11 RX ADMIN — OXYCODONE HYDROCHLORIDE 10 MG: 10 TABLET ORAL at 05:13

## 2021-08-11 RX ADMIN — ACETAMINOPHEN 975 MG: 325 TABLET, FILM COATED ORAL at 05:13

## 2021-08-11 RX ADMIN — ACETAMINOPHEN 975 MG: 325 TABLET, FILM COATED ORAL at 22:21

## 2021-08-11 RX ADMIN — THIAMINE HCL TAB 100 MG 100 MG: 100 TAB at 09:30

## 2021-08-11 RX ADMIN — ACETAMINOPHEN 975 MG: 325 TABLET, FILM COATED ORAL at 14:08

## 2021-08-11 RX ADMIN — METHOCARBAMOL 500 MG: 500 TABLET, FILM COATED ORAL at 18:04

## 2021-08-11 NOTE — ASSESSMENT & PLAN NOTE
-found to have lobular wall thickening of the sigmoid colon, colitis versus mass  -will likely need colonoscopy referral to rule out cancer  -review of incidental findings

## 2021-08-11 NOTE — ASSESSMENT & PLAN NOTE
- R clavicle fx, present on admission  - Appreciate ortho consult  - NWB RUE in sling, NVI  - Pain control, appreciate APS consult  - PT and OT recommend DC to rehab
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
## 2021-08-11 NOTE — ASSESSMENT & PLAN NOTE
-right 2 through 7 rib fractures, questionable flail segment at 2 through 5; present on admission  -no hemo or pneumothorax but does have posttraumatic atelectasis in the anterior lateral right upper lobe  - Rib fracture protocol  -Appreciate APS recommendations  -pain control, multimodal pain regimen  - Currently pulling 1500 mL on IS  -Follow up CXR: No pneumothorax or hemothorax   Right rib and right clavicle fracture   - PT and OT recommend DC to rehab

## 2021-08-11 NOTE — ASSESSMENT & PLAN NOTE
-right 2 through 7 rib fractures, questionable flail segment at 2 through 5; present on admission  -no hemo or pneumothorax but does have posttraumatic atelectasis in the anterior lateral right upper lobe  - Rib fracture protocol  -Appreciate APS recommendations  -pain control, multimodal pain regimen  -Follow up CXR: No pneumothorax or hemothorax   Right rib and right clavicle fracture   - PT and OT recommend DC to rehab

## 2021-08-11 NOTE — ASSESSMENT & PLAN NOTE
- Patient reports she has history of HTN and that she used to take Lisinopril before she lost her insurance  - Initiate Lisinopril  - Monitor BP, remains slightly elevated

## 2021-08-11 NOTE — ASSESSMENT & PLAN NOTE
-patient had acute alcohol intoxication on admission  -thiamine and folate, CIWA protocol  -patient endorsing suicidal thoughts per EMS  - Patient now sober  Asked about suicidal ideation and she clearly without any hesitation stated absolutely not  States her  is a  and comes home for the weekend  Stops at home then goes stays with his girlfriend  This has been the situation for some time  She denies suicidal ideations, denies any threats from  or being afraid of him    - Patient admits that she only drinks alcohol on the weekends, and feel that she does not have a problem with her alcohol consumption
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
## 2021-08-11 NOTE — ASSESSMENT & PLAN NOTE
- Patient reports she has history of HTN and that she used to take Lisinopril before she lost her insurance  - Initiate Lisinopril  - Monitor BP

## 2021-08-11 NOTE — ASSESSMENT & PLAN NOTE
- Patient reports that she took medications for anxiety before she lost insurance, does not remember the name of the medication  - Trial lexapro  - Follow up outpatient

## 2021-08-12 LAB
ANION GAP SERPL CALCULATED.3IONS-SCNC: 6 MMOL/L (ref 4–13)
BASOPHILS # BLD AUTO: 0.03 THOUSANDS/ΜL (ref 0–0.1)
BASOPHILS NFR BLD AUTO: 1 % (ref 0–1)
BUN SERPL-MCNC: 4 MG/DL (ref 5–25)
CALCIUM SERPL-MCNC: 9.1 MG/DL (ref 8.3–10.1)
CHLORIDE SERPL-SCNC: 102 MMOL/L (ref 100–108)
CO2 SERPL-SCNC: 28 MMOL/L (ref 21–32)
CREAT SERPL-MCNC: 0.24 MG/DL (ref 0.6–1.3)
EOSINOPHIL # BLD AUTO: 0.13 THOUSAND/ΜL (ref 0–0.61)
EOSINOPHIL NFR BLD AUTO: 2 % (ref 0–6)
ERYTHROCYTE [DISTWIDTH] IN BLOOD BY AUTOMATED COUNT: 17.3 % (ref 11.6–15.1)
GFR SERPL CREATININE-BSD FRML MDRD: 143 ML/MIN/1.73SQ M
GLUCOSE SERPL-MCNC: 97 MG/DL (ref 65–140)
HCT VFR BLD AUTO: 37.2 % (ref 34.8–46.1)
HGB BLD-MCNC: 12.3 G/DL (ref 11.5–15.4)
IMM GRANULOCYTES # BLD AUTO: 0.03 THOUSAND/UL (ref 0–0.2)
IMM GRANULOCYTES NFR BLD AUTO: 1 % (ref 0–2)
LYMPHOCYTES # BLD AUTO: 1.39 THOUSANDS/ΜL (ref 0.6–4.47)
LYMPHOCYTES NFR BLD AUTO: 23 % (ref 14–44)
MCH RBC QN AUTO: 32 PG (ref 26.8–34.3)
MCHC RBC AUTO-ENTMCNC: 33.1 G/DL (ref 31.4–37.4)
MCV RBC AUTO: 97 FL (ref 82–98)
MONOCYTES # BLD AUTO: 0.67 THOUSAND/ΜL (ref 0.17–1.22)
MONOCYTES NFR BLD AUTO: 11 % (ref 4–12)
NEUTROPHILS # BLD AUTO: 3.81 THOUSANDS/ΜL (ref 1.85–7.62)
NEUTS SEG NFR BLD AUTO: 62 % (ref 43–75)
NRBC BLD AUTO-RTO: 0 /100 WBCS
PLATELET # BLD AUTO: 205 THOUSANDS/UL (ref 149–390)
PMV BLD AUTO: 10.5 FL (ref 8.9–12.7)
POTASSIUM SERPL-SCNC: 3.8 MMOL/L (ref 3.5–5.3)
RBC # BLD AUTO: 3.84 MILLION/UL (ref 3.81–5.12)
SODIUM SERPL-SCNC: 136 MMOL/L (ref 136–145)
WBC # BLD AUTO: 6.06 THOUSAND/UL (ref 4.31–10.16)

## 2021-08-12 PROCEDURE — 85025 COMPLETE CBC W/AUTO DIFF WBC: CPT | Performed by: NURSE PRACTITIONER

## 2021-08-12 PROCEDURE — 99232 SBSQ HOSP IP/OBS MODERATE 35: CPT | Performed by: EMERGENCY MEDICINE

## 2021-08-12 PROCEDURE — 80048 BASIC METABOLIC PNL TOTAL CA: CPT | Performed by: NURSE PRACTITIONER

## 2021-08-12 RX ORDER — METHOCARBAMOL 750 MG/1
750 TABLET, FILM COATED ORAL EVERY 6 HOURS SCHEDULED
Status: DISCONTINUED | OUTPATIENT
Start: 2021-08-12 | End: 2021-08-26 | Stop reason: HOSPADM

## 2021-08-12 RX ADMIN — ACETAMINOPHEN 975 MG: 325 TABLET, FILM COATED ORAL at 05:42

## 2021-08-12 RX ADMIN — THIAMINE HCL TAB 100 MG 100 MG: 100 TAB at 08:46

## 2021-08-12 RX ADMIN — LORAZEPAM 0.5 MG: 0.5 TABLET ORAL at 21:21

## 2021-08-12 RX ADMIN — METHOCARBAMOL 500 MG: 500 TABLET, FILM COATED ORAL at 00:37

## 2021-08-12 RX ADMIN — OXYCODONE HYDROCHLORIDE 10 MG: 10 TABLET ORAL at 22:39

## 2021-08-12 RX ADMIN — ENOXAPARIN SODIUM 30 MG: 30 INJECTION SUBCUTANEOUS at 21:21

## 2021-08-12 RX ADMIN — OXYCODONE HYDROCHLORIDE 10 MG: 10 TABLET ORAL at 03:34

## 2021-08-12 RX ADMIN — OXYCODONE HYDROCHLORIDE 10 MG: 10 TABLET ORAL at 17:58

## 2021-08-12 RX ADMIN — GABAPENTIN 300 MG: 300 CAPSULE ORAL at 21:21

## 2021-08-12 RX ADMIN — ACETAMINOPHEN 975 MG: 325 TABLET, FILM COATED ORAL at 21:21

## 2021-08-12 RX ADMIN — HYDROMORPHONE HYDROCHLORIDE 0.5 MG: 1 INJECTION, SOLUTION INTRAMUSCULAR; INTRAVENOUS; SUBCUTANEOUS at 20:11

## 2021-08-12 RX ADMIN — LISINOPRIL 10 MG: 10 TABLET ORAL at 08:46

## 2021-08-12 RX ADMIN — GLYCERIN, HYPROMELLOSE, POLYETHYLENE GLYCOL 1 DROP: .2; .2; 1 LIQUID OPHTHALMIC at 17:58

## 2021-08-12 RX ADMIN — OXYCODONE HYDROCHLORIDE 10 MG: 10 TABLET ORAL at 08:46

## 2021-08-12 RX ADMIN — ESCITALOPRAM OXALATE 10 MG: 10 TABLET ORAL at 08:46

## 2021-08-12 RX ADMIN — METHOCARBAMOL 750 MG: 500 TABLET, FILM COATED ORAL at 17:58

## 2021-08-12 RX ADMIN — FOLIC ACID 1 MG: 1 TABLET ORAL at 08:45

## 2021-08-12 RX ADMIN — ENOXAPARIN SODIUM 30 MG: 30 INJECTION SUBCUTANEOUS at 08:45

## 2021-08-12 RX ADMIN — METHOCARBAMOL 750 MG: 500 TABLET, FILM COATED ORAL at 11:32

## 2021-08-12 RX ADMIN — SENNOSIDES 17.2 MG: 8.6 TABLET ORAL at 08:46

## 2021-08-12 RX ADMIN — OXYCODONE HYDROCHLORIDE 10 MG: 10 TABLET ORAL at 13:20

## 2021-08-12 RX ADMIN — HYDROMORPHONE HYDROCHLORIDE 0.5 MG: 1 INJECTION, SOLUTION INTRAMUSCULAR; INTRAVENOUS; SUBCUTANEOUS at 10:06

## 2021-08-12 RX ADMIN — LORAZEPAM 0.5 MG: 0.5 TABLET ORAL at 10:32

## 2021-08-12 RX ADMIN — ACETAMINOPHEN 975 MG: 325 TABLET, FILM COATED ORAL at 13:20

## 2021-08-12 RX ADMIN — METHOCARBAMOL 500 MG: 500 TABLET, FILM COATED ORAL at 05:42

## 2021-08-12 NOTE — PLAN OF CARE
Problem: Potential for Falls  Goal: Patient will remain free of falls  Description: INTERVENTIONS:  - Educate patient/family on patient safety including physical limitations  - Instruct patient to call for assistance with activity   - Consult OT/PT to assist with strengthening/mobility   - Keep Call bell within reach  - Keep bed low and locked with side rails adjusted as appropriate  - Keep care items and personal belongings within reach  - Initiate and maintain comfort rounds  - Make Fall Risk Sign visible to staff  - Offer Toileting every  Hours, in advance of need  - Initiate/Maintain alarm  - Obtain necessary fall risk management equipment:   - Apply yellow socks and bracelet for high fall risk patients  - Consider moving patient to room near nurses station  Outcome: Progressing     Problem: DISCHARGE PLANNING - CARE MANAGEMENT  Goal: Discharge to post-acute care or home with appropriate resources  Description: INTERVENTIONS:  - Conduct assessment to determine patient/family and health care team treatment goals, and need for post-acute services based on payer coverage, community resources, and patient preferences, and barriers to discharge  - Address psychosocial, clinical, and financial barriers to discharge as identified in assessment in conjunction with the patient/family and health care team  - Arrange appropriate level of post-acute services according to patients   needs and preference and payer coverage in collaboration with the physician and health care team  - Communicate with and update the patient/family, physician, and health care team regarding progress on the discharge plan  - Arrange appropriate transportation to post-acute venues  Outcome: Progressing     Problem: PAIN - ADULT  Goal: Verbalizes/displays adequate comfort level or baseline comfort level  Description: Interventions:  - Encourage patient to monitor pain and request assistance  - Assess pain using appropriate pain scale  - Administer analgesics based on type and severity of pain and evaluate response  - Implement non-pharmacological measures as appropriate and evaluate response  - Consider cultural and social influences on pain and pain management  - Notify physician/advanced practitioner if interventions unsuccessful or patient reports new pain  Outcome: Progressing     Problem: Knowledge Deficit  Goal: Patient/family/caregiver demonstrates understanding of disease process, treatment plan, medications, and discharge instructions  Description: Complete learning assessment and assess knowledge base    Interventions:  - Provide teaching at level of understanding  - Provide teaching via preferred learning methods  Outcome: Progressing     Problem: SKIN/TISSUE INTEGRITY - ADULT  Goal: Incision(s), wounds(s) or drain site(s) healing without S/S of infection  Description: INTERVENTIONS  - Assess and document dressing, incision, wound bed, drain sites and surrounding tissue  - Provide patient and family education  - Perform skin care/dressing changes every   Outcome: Progressing     Problem: MUSCULOSKELETAL - ADULT  Goal: Maintain or return mobility to safest level of function  Description: INTERVENTIONS:  - Assess patient's ability to carry out ADLs; assess patient's baseline for ADL function and identify physical deficits which impact ability to perform ADLs (bathing, care of mouth/teeth, toileting, grooming, dressing, etc )  - Assess/evaluate cause of self-care deficits   - Assess range of motion  - Assess patient's mobility  - Assess patient's need for assistive devices and provide as appropriate  - Encourage maximum independence but intervene and supervise when necessary  - Involve family in performance of ADLs  - Assess for home care needs following discharge   - Consider OT consult to assist with ADL evaluation and planning for discharge  - Provide patient education as appropriate  Outcome: Progressing     Problem: MOBILITY - ADULT  Goal: Maintain or return to baseline ADL function  Description: INTERVENTIONS:  -  Assess patient's ability to carry out ADLs; assess patient's baseline for ADL function and identify physical deficits which impact ability to perform ADLs (bathing, care of mouth/teeth, toileting, grooming, dressing, etc )  - Assess/evaluate cause of self-care deficits   - Assess range of motion  - Assess patient's mobility; develop plan if impaired  - Assess patient's need for assistive devices and provide as appropriate  - Encourage maximum independence but intervene and supervise when necessary  - Involve family in performance of ADLs  - Assess for home care needs following discharge   - Consider OT consult to assist with ADL evaluation and planning for discharge  - Provide patient education as appropriate  Outcome: Progressing  Goal: Maintains/Returns to pre admission functional level  Description: INTERVENTIONS:  - Perform BMAT or MOVE assessment daily    - Set and communicate daily mobility goal to care team and patient/family/caregiver  - Collaborate with rehabilitation services on mobility goals if consulted  - Perform Range of Motion  times a day  - Reposition patient every  hours  - Dangle patient  times a day  - Stand patient  times a day  - Ambulate patient  times a day  - Out of bed to chair  times a day   - Out of bed for meal times a day  - Out of bed for toileting  - Record patient progress and toleration of activity level   Outcome: Progressing     Problem: Nutrition/Hydration-ADULT  Goal: Nutrient/Hydration intake appropriate for improving, restoring or maintaining nutritional needs  Description: Monitor and assess patient's nutrition/hydration status for malnutrition  Collaborate with interdisciplinary team and initiate plan and interventions as ordered  Monitor patient's weight and dietary intake as ordered or per policy  Utilize nutrition screening tool and intervene as necessary   Determine patient's food preferences and provide high-protein, high-caloric foods as appropriate       INTERVENTIONS:  - Monitor oral intake, urinary output, labs, and treatment plans  - Assess nutrition and hydration status and recommend course of action  - Evaluate amount of meals eaten  - Assist patient with eating if necessary   - Allow adequate time for meals  - Recommend/ encourage appropriate diets, oral nutritional supplements, and vitamin/mineral supplements  - Order, calculate, and assess calorie counts as needed  - Recommend, monitor, and adjust tube feedings and TPN/PPN based on assessed needs  - Assess need for intravenous fluids  - Provide specific nutrition/hydration education as appropriate  - Include patient/family/caregiver in decisions related to nutrition  Outcome: Progressing

## 2021-08-12 NOTE — CASE MANAGEMENT
[facility] unable to accept  Patient agreeable to blanket referral placed in [facility] to see which facilities can accept
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
## 2021-08-13 PROCEDURE — 97535 SELF CARE MNGMENT TRAINING: CPT

## 2021-08-13 PROCEDURE — 99232 SBSQ HOSP IP/OBS MODERATE 35: CPT | Performed by: EMERGENCY MEDICINE

## 2021-08-13 PROCEDURE — 97530 THERAPEUTIC ACTIVITIES: CPT

## 2021-08-13 PROCEDURE — 97116 GAIT TRAINING THERAPY: CPT

## 2021-08-13 RX ADMIN — HYDROMORPHONE HYDROCHLORIDE 0.5 MG: 1 INJECTION, SOLUTION INTRAMUSCULAR; INTRAVENOUS; SUBCUTANEOUS at 19:41

## 2021-08-13 RX ADMIN — ACETAMINOPHEN 975 MG: 325 TABLET, FILM COATED ORAL at 05:34

## 2021-08-13 RX ADMIN — METHOCARBAMOL 750 MG: 500 TABLET, FILM COATED ORAL at 11:57

## 2021-08-13 RX ADMIN — OXYCODONE HYDROCHLORIDE 10 MG: 10 TABLET ORAL at 21:43

## 2021-08-13 RX ADMIN — METHOCARBAMOL 750 MG: 500 TABLET, FILM COATED ORAL at 05:34

## 2021-08-13 RX ADMIN — ENOXAPARIN SODIUM 30 MG: 30 INJECTION SUBCUTANEOUS at 21:42

## 2021-08-13 RX ADMIN — ESCITALOPRAM OXALATE 10 MG: 10 TABLET ORAL at 08:58

## 2021-08-13 RX ADMIN — OXYCODONE HYDROCHLORIDE 10 MG: 10 TABLET ORAL at 08:57

## 2021-08-13 RX ADMIN — GABAPENTIN 300 MG: 300 CAPSULE ORAL at 21:43

## 2021-08-13 RX ADMIN — HYDROMORPHONE HYDROCHLORIDE 0.5 MG: 1 INJECTION, SOLUTION INTRAMUSCULAR; INTRAVENOUS; SUBCUTANEOUS at 14:55

## 2021-08-13 RX ADMIN — OXYCODONE HYDROCHLORIDE 10 MG: 10 TABLET ORAL at 03:48

## 2021-08-13 RX ADMIN — LORAZEPAM 0.5 MG: 0.5 TABLET ORAL at 12:00

## 2021-08-13 RX ADMIN — LISINOPRIL 10 MG: 10 TABLET ORAL at 08:58

## 2021-08-13 RX ADMIN — ACETAMINOPHEN 975 MG: 325 TABLET, FILM COATED ORAL at 21:42

## 2021-08-13 RX ADMIN — THIAMINE HCL TAB 100 MG 100 MG: 100 TAB at 08:57

## 2021-08-13 RX ADMIN — HYDROMORPHONE HYDROCHLORIDE 0.5 MG: 1 INJECTION, SOLUTION INTRAMUSCULAR; INTRAVENOUS; SUBCUTANEOUS at 10:09

## 2021-08-13 RX ADMIN — SENNOSIDES 17.2 MG: 8.6 TABLET ORAL at 08:57

## 2021-08-13 RX ADMIN — ENOXAPARIN SODIUM 30 MG: 30 INJECTION SUBCUTANEOUS at 08:58

## 2021-08-13 RX ADMIN — OXYCODONE HYDROCHLORIDE 10 MG: 10 TABLET ORAL at 13:34

## 2021-08-13 RX ADMIN — ACETAMINOPHEN 975 MG: 325 TABLET, FILM COATED ORAL at 13:30

## 2021-08-13 RX ADMIN — FOLIC ACID 1 MG: 1 TABLET ORAL at 08:57

## 2021-08-13 RX ADMIN — OXYCODONE HYDROCHLORIDE 10 MG: 10 TABLET ORAL at 17:36

## 2021-08-13 RX ADMIN — METHOCARBAMOL 750 MG: 500 TABLET, FILM COATED ORAL at 17:36

## 2021-08-13 NOTE — PLAN OF CARE
Problem: Potential for Falls  Goal: Patient will remain free of falls  Description: INTERVENTIONS:  - Educate patient/family on patient safety including physical limitations  - Instruct patient to call for assistance with activity   - Consult OT/PT to assist with strengthening/mobility   - Keep Call bell within reach  - Keep bed low and locked with side rails adjusted as appropriate  - Keep care items and personal belongings within reach  - Initiate and maintain comfort rounds  - Make Fall Risk Sign visible to staff  - Offer Toileting every  Hours, in advance of need  - Initiate/Maintain alarm  - Obtain necessary fall risk management equipment:   - Apply yellow socks and bracelet for high fall risk patients  - Consider moving patient to room near nurses station  Outcome: Progressing     Problem: DISCHARGE PLANNING - CARE MANAGEMENT  Goal: Discharge to post-acute care or home with appropriate resources  Description: INTERVENTIONS:  - Conduct assessment to determine patient/family and health care team treatment goals, and need for post-acute services based on payer coverage, community resources, and patient preferences, and barriers to discharge  - Address psychosocial, clinical, and financial barriers to discharge as identified in assessment in conjunction with the patient/family and health care team  - Arrange appropriate level of post-acute services according to patients   needs and preference and payer coverage in collaboration with the physician and health care team  - Communicate with and update the patient/family, physician, and health care team regarding progress on the discharge plan  - Arrange appropriate transportation to post-acute venues  Outcome: Progressing     Problem: PAIN - ADULT  Goal: Verbalizes/displays adequate comfort level or baseline comfort level  Description: Interventions:  - Encourage patient to monitor pain and request assistance  - Assess pain using appropriate pain scale  - Administer analgesics based on type and severity of pain and evaluate response  - Implement non-pharmacological measures as appropriate and evaluate response  - Consider cultural and social influences on pain and pain management  - Notify physician/advanced practitioner if interventions unsuccessful or patient reports new pain  Outcome: Progressing     Problem: Knowledge Deficit  Goal: Patient/family/caregiver demonstrates understanding of disease process, treatment plan, medications, and discharge instructions  Description: Complete learning assessment and assess knowledge base    Interventions:  - Provide teaching at level of understanding  - Provide teaching via preferred learning methods  Outcome: Progressing     Problem: SKIN/TISSUE INTEGRITY - ADULT  Goal: Incision(s), wounds(s) or drain site(s) healing without S/S of infection  Description: INTERVENTIONS  - Assess and document dressing, incision, wound bed, drain sites and surrounding tissue  - Provide patient and family education  - Perform skin care/dressing changes every   Outcome: Progressing     Problem: MUSCULOSKELETAL - ADULT  Goal: Maintain or return mobility to safest level of function  Description: INTERVENTIONS:  - Assess patient's ability to carry out ADLs; assess patient's baseline for ADL function and identify physical deficits which impact ability to perform ADLs (bathing, care of mouth/teeth, toileting, grooming, dressing, etc )  - Assess/evaluate cause of self-care deficits   - Assess range of motion  - Assess patient's mobility  - Assess patient's need for assistive devices and provide as appropriate  - Encourage maximum independence but intervene and supervise when necessary  - Involve family in performance of ADLs  - Assess for home care needs following discharge   - Consider OT consult to assist with ADL evaluation and planning for discharge  - Provide patient education as appropriate  Outcome: Progressing     Problem: MOBILITY - ADULT  Goal: Maintain or return to baseline ADL function  Description: INTERVENTIONS:  -  Assess patient's ability to carry out ADLs; assess patient's baseline for ADL function and identify physical deficits which impact ability to perform ADLs (bathing, care of mouth/teeth, toileting, grooming, dressing, etc )  - Assess/evaluate cause of self-care deficits   - Assess range of motion  - Assess patient's mobility; develop plan if impaired  - Assess patient's need for assistive devices and provide as appropriate  - Encourage maximum independence but intervene and supervise when necessary  - Involve family in performance of ADLs  - Assess for home care needs following discharge   - Consider OT consult to assist with ADL evaluation and planning for discharge  - Provide patient education as appropriate  Outcome: Progressing  Goal: Maintains/Returns to pre admission functional level  Description: INTERVENTIONS:  - Perform BMAT or MOVE assessment daily    - Set and communicate daily mobility goal to care team and patient/family/caregiver  - Collaborate with rehabilitation services on mobility goals if consulted  - Perform Range of Motion  times a day  - Reposition patient every  hours  - Dangle patient  times a day  - Stand patient  times a day  - Ambulate patient  times a day  - Out of bed to chair  times a day   - Out of bed for meals times a day  - Out of bed for toileting  - Record patient progress and toleration of activity level   Outcome: Progressing     Problem: Nutrition/Hydration-ADULT  Goal: Nutrient/Hydration intake appropriate for improving, restoring or maintaining nutritional needs  Description: Monitor and assess patient's nutrition/hydration status for malnutrition  Collaborate with interdisciplinary team and initiate plan and interventions as ordered  Monitor patient's weight and dietary intake as ordered or per policy  Utilize nutrition screening tool and intervene as necessary   Determine patient's food preferences and provide high-protein, high-caloric foods as appropriate       INTERVENTIONS:  - Monitor oral intake, urinary output, labs, and treatment plans  - Assess nutrition and hydration status and recommend course of action  - Evaluate amount of meals eaten  - Assist patient with eating if necessary   - Allow adequate time for meals  - Recommend/ encourage appropriate diets, oral nutritional supplements, and vitamin/mineral supplements  - Order, calculate, and assess calorie counts as needed  - Recommend, monitor, and adjust tube feedings and TPN/PPN based on assessed needs  - Assess need for intravenous fluids  - Provide specific nutrition/hydration education as appropriate  - Include patient/family/caregiver in decisions related to nutrition  Outcome: Progressing

## 2021-08-13 NOTE — PLAN OF CARE
Problem: PHYSICAL THERAPY ADULT  Goal: Performs mobility at highest level of function for planned discharge setting  See evaluation for individualized goals  Description: Treatment/Interventions: LE strengthening/ROM, Functional transfer training, Elevations, Therapeutic exercise, Endurance training, Patient/family training, Equipment eval/education, Bed mobility, Gait training, Spoke to nursing, OT  Equipment Recommended:  (continue to assess )       See flowsheet documentation for full assessment, interventions and recommendations  Outcome: Progressing  Note: Prognosis: Good  Problem List: Decreased strength, Decreased endurance, Impaired balance, Decreased mobility, Decreased coordination, Impaired judgement, Decreased cognition, Pain, Orthopedic restrictions, Decreased safety awareness, Decreased range of motion  Assessment: RN reported she lets the patient use the bathroom by herself without staff  Hence no chair alarm engaged post session  Pt reported she already went to bathroom by herself [facility] staff members present  Pt  reported she took the SCDs off by herself  Pt  noted to be unsteady with static standing and ambulation with LE instability and shaking  Attempted gait with HHA, Quad cane and SPC and patient ambulated better with SPC  Demonstrated gait with cane and patient able to perform 3point gait progressed to 2 point gait occ  Pt  is gait unsteady and RUE NWBing limits her functional mobility and independence  Recommend rehab at NH to address the above deficits of imbalance, strength, endurance  Barriers to Discharge: Inaccessible home environment, Decreased caregiver support        PT Discharge Recommendation: Post acute rehabilitation services     PT - OK to Discharge: Yes    See flowsheet documentation for full assessment

## 2021-08-13 NOTE — PHYSICAL THERAPY NOTE
Physical Therapy Treatment Note     08/13/21 1216   PT Last Visit   PT Visit Date 08/13/21   Note Type   Note Type Treatment   Pain Assessment   Pain Assessment Tool 0-10   Pain Score 8   Pain Location/Orientation Orientation: Right;Location: Shoulder   Restrictions/Precautions   Weight Bearing Precautions Per Order Yes   RUE Weight Bearing Per Order NWB   Braces or Orthoses Sling   Other Precautions Fall Risk;Pain;Limb alert;WBS;Cognitive   General   Chart Reviewed Yes   Family/Caregiver Present No   Cognition   Overall Cognitive Status WFL   Subjective   Subjective Pt  seated at EOB upon entry  Reported pain in R shoulder  pt  agreeable to PT  Pt  had the sling on RUE  Transfers   Sit to Stand 4  Minimal assistance   Additional items Assist x 1;Verbal cues; Increased time required   Stand to Sit 4  Minimal assistance   Additional items Assist x 1; Increased time required;Verbal cues;Armrests   Stand pivot 4  Minimal assistance   Additional items Assist x 1;Verbal cues; Increased time required   Ambulation/Elevation   Gait pattern Excessively slow; Ataxia; Short stride;Decreased foot clearance; Improper Weight shift  (lateral sways)   Gait Assistance 4  Minimal assist   Additional items Assist x 1;Assist x 2;Verbal cues  (2nd A for chair follow)   Assistive Device Other (Comment); None;SPC;Small base quad cane   Distance 120ft with 2 seated rests in between   Balance   Static Sitting Fair   Ambulatory Poor   Endurance Deficit   Endurance Deficit Yes   Endurance Deficit Description fatigue   Activity Tolerance   Activity Tolerance Patient tolerated treatment well;Patient limited by fatigue   Nurse Made Aware Yes   Assessment   Prognosis Good   Problem List Decreased strength;Decreased endurance; Impaired balance;Decreased mobility; Decreased coordination; Impaired judgement;Decreased cognition;Pain;Orthopedic restrictions;Decreased safety awareness;Decreased range of motion   Assessment RN reported she lets the patient use the bathroom by herself without staff  Hence no chair alarm engaged post session  Pt reported she already went to bathroom by herself [facility] staff members present  Pt  reported she took the SCDs off by herself  Pt  noted to be unsteady with static standing and ambulation with LE instability and shaking  Attempted gait with HHA, Quad cane and SPC and patient ambulated better with SPC  Demonstrated gait with cane and patient able to perform 3point gait progressed to 2 point gait occ  Pt  is gait unsteady and RUE NWBing limits her functional mobility and independence  Recommend rehab at [facility] to address the above deficits of imbalance, strength, endurance     Barriers to Discharge Inaccessible home environment;Decreased caregiver support   Goals   Patient Goals None reported   STG Expiration Date 08/20/21   PT Treatment Day 1   Plan   Treatment/Interventions Gait training;Equipment eval/education;Patient/family training;Functional transfer training;Spoke to nursing   Progress Progressing toward goals   PT Frequency Other (Comment)  (3-6x/week)   Recommendation   PT Discharge Recommendation Post acute rehabilitation services   PT - OK to Discharge Yes         Tabitha Sampson, PTA

## 2021-08-13 NOTE — PLAN OF CARE
Problem: OCCUPATIONAL THERAPY ADULT  Goal: Performs self-care activities at highest level of function for planned discharge setting  See evaluation for individualized goals  Description: Treatment Interventions: ADL retraining, Functional transfer training, Endurance training, Cognitive reorientation, Patient/family training, Equipment evaluation/education, Compensatory technique education, Energy conservation, Activityengagement          See flowsheet documentation for full assessment, interventions and recommendations  Outcome: Progressing  Note: Limitation: Decreased ADL status, Decreased Safe judgement during ADL, Decreased cognition, Decreased endurance, Decreased self-care trans, Decreased high-level ADLs  Prognosis: Good  Assessment: pt participated in am ot session and was seen focusing  on adls seated eob, dynamic reaching into closet and functional balance  pt with unsteadness while in static stance and with functoinal mobility  pt reports having anxiety  pt continues to require in pt rehab as she needs to perform all iadls and adls without asst  will follow focusing on goals from ie      OT Discharge Recommendation: Post acute rehabilitation services  OT - OK to Discharge:  Yes  BERNIE Miguel
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
## 2021-08-13 NOTE — CASE MANAGEMENT
[facility] may be able to accept but they want to speak to pt's sister Jeferson Edwards [phone]   CM called her and left a voicemail

## 2021-08-13 NOTE — OCCUPATIONAL THERAPY NOTE
Occupational Therapy Treatment Note:       08/13/21 1110   OT Last Visit   OT Visit Date 08/13/21   Note Type   Note Type Treatment   Restrictions/Precautions   RUE Weight Bearing Per Order NWB   Braces or Orthoses Sling  (foam elevator sling provided)   Other Precautions Cognitive; Bed Alarm; Fall Risk   Pain Assessment   Pain Assessment Tool 0-10   Pain Score 5  (5)   ADL   Where Assessed Edge of bed   Grooming Assistance 5  Supervision/Setup   UB Bathing Assistance 5  Supervision/Setup   LB Bathing Assistance 4  Minimal Assistance   UB Dressing Assistance 4  Minimal Assistance  (to josef doff sling and gown)   LB Dressing Assistance 4  Minimal Assistance   LB Dressing Comments asst for balance during clothing management  pt leans on bed for balance c back of legs   Functional Standing Tolerance   Time fair minus balance during adls noted while reaching for items on tables   Bed Mobility   Supine to Sit 5  Supervision   Sit to Supine 5  Supervision   Transfers   Sit to Stand 5  Supervision   Stand to Sit 5  Supervision   Stand pivot 5  Supervision   Functional Mobility   Functional Mobility 4  Minimal assistance   Additional Comments min asst to reach into top and mid level of clset  (pt with unsteady balance during functional mobility, guarded)   Additional items Hand hold assistance   Cognition   Arousal/Participation Alert   Attention Within functional limits   Memory Decreased recall of precautions   Following Commands Follows one step commands without difficulty   Activity Tolerance   Activity Tolerance Patient limited by fatigue   Assessment   Assessment pt participated in am ot session and was seen focusing  on adls seated eob, dynamic reaching into closet and functional balance  pt with unsteadness while in static stance and with functoinal mobility  pt reports having anxiety   pt continues to require in pt rehab as she needs to perform all iadls and adls without asst  will follow focusing on goals from ie Plan   Treatment Interventions ADL retraining;Functional transfer training; Endurance training;Patient/family training;Equipment evaluation/education; Activityengagement   Goal Expiration Date 08/24/21   OT Treatment Day 1   OT Frequency 3-5x/wk   Recommendation   OT Discharge Recommendation Post acute rehabilitation services   OT - OK to Discharge Yes   BERNIE Miguel

## 2021-08-13 NOTE — ASSESSMENT & PLAN NOTE
-patient had acute alcohol intoxication on admission  -thiamine and folate, CIWA protocol  -patient endorsing suicidal thoughts per EMS  - Patient now sober  Asked about suicidal ideation and she clearly without any hesitation stated absolutely not  States her  is a  and comes home for the weekend  Stops at home then goes stays with his girlfriend  This has been the situation for some time  She denies suicidal ideations, denies any threats from  or being afraid of him    - Patient admits that she only drinks alcohol on the weekends, and feel that she does not have a problem with her alcohol consumption   - tearful again this morning

## 2021-08-13 NOTE — CASE MANAGEMENT
Pt's determination letter obtained and uploaded to all scripts  CM also placed a copy in medical records  CM gave the letter to all SNF who are following   Currently no accepting locations

## 2021-08-14 PROBLEM — S00.83XA FACIAL CONTUSION, INITIAL ENCOUNTER: Status: ACTIVE | Noted: 2021-08-14

## 2021-08-14 PROCEDURE — 99232 SBSQ HOSP IP/OBS MODERATE 35: CPT | Performed by: STUDENT IN AN ORGANIZED HEALTH CARE EDUCATION/TRAINING PROGRAM

## 2021-08-14 RX ORDER — LIDOCAINE 50 MG/G
1 PATCH TOPICAL DAILY
Status: DISCONTINUED | OUTPATIENT
Start: 2021-08-14 | End: 2021-08-26 | Stop reason: HOSPADM

## 2021-08-14 RX ADMIN — METHOCARBAMOL 750 MG: 500 TABLET, FILM COATED ORAL at 12:35

## 2021-08-14 RX ADMIN — HYDROMORPHONE HYDROCHLORIDE 0.5 MG: 1 INJECTION, SOLUTION INTRAMUSCULAR; INTRAVENOUS; SUBCUTANEOUS at 12:35

## 2021-08-14 RX ADMIN — HYDROMORPHONE HYDROCHLORIDE 0.5 MG: 1 INJECTION, SOLUTION INTRAMUSCULAR; INTRAVENOUS; SUBCUTANEOUS at 08:18

## 2021-08-14 RX ADMIN — OXYCODONE HYDROCHLORIDE 10 MG: 10 TABLET ORAL at 20:16

## 2021-08-14 RX ADMIN — HYDROMORPHONE HYDROCHLORIDE 0.5 MG: 1 INJECTION, SOLUTION INTRAMUSCULAR; INTRAVENOUS; SUBCUTANEOUS at 03:59

## 2021-08-14 RX ADMIN — ACETAMINOPHEN 975 MG: 325 TABLET, FILM COATED ORAL at 22:24

## 2021-08-14 RX ADMIN — LORAZEPAM 0.5 MG: 0.5 TABLET ORAL at 17:56

## 2021-08-14 RX ADMIN — SENNOSIDES 17.2 MG: 8.6 TABLET ORAL at 08:18

## 2021-08-14 RX ADMIN — ENOXAPARIN SODIUM 30 MG: 30 INJECTION SUBCUTANEOUS at 22:24

## 2021-08-14 RX ADMIN — METHOCARBAMOL 750 MG: 500 TABLET, FILM COATED ORAL at 17:56

## 2021-08-14 RX ADMIN — FOLIC ACID 1 MG: 1 TABLET ORAL at 08:19

## 2021-08-14 RX ADMIN — OXYCODONE HYDROCHLORIDE 10 MG: 10 TABLET ORAL at 05:24

## 2021-08-14 RX ADMIN — GABAPENTIN 300 MG: 300 CAPSULE ORAL at 22:24

## 2021-08-14 RX ADMIN — METHOCARBAMOL 750 MG: 500 TABLET, FILM COATED ORAL at 00:09

## 2021-08-14 RX ADMIN — ACETAMINOPHEN 975 MG: 325 TABLET, FILM COATED ORAL at 13:26

## 2021-08-14 RX ADMIN — ACETAMINOPHEN 975 MG: 325 TABLET, FILM COATED ORAL at 05:23

## 2021-08-14 RX ADMIN — HYDROMORPHONE HYDROCHLORIDE 0.5 MG: 1 INJECTION, SOLUTION INTRAMUSCULAR; INTRAVENOUS; SUBCUTANEOUS at 22:24

## 2021-08-14 RX ADMIN — ESCITALOPRAM OXALATE 10 MG: 10 TABLET ORAL at 08:19

## 2021-08-14 RX ADMIN — LIDOCAINE 1 PATCH: 50 PATCH TOPICAL at 16:03

## 2021-08-14 RX ADMIN — METHOCARBAMOL 750 MG: 500 TABLET, FILM COATED ORAL at 05:23

## 2021-08-14 RX ADMIN — OXYCODONE HYDROCHLORIDE 10 MG: 10 TABLET ORAL at 16:03

## 2021-08-14 RX ADMIN — THIAMINE HCL TAB 100 MG 100 MG: 100 TAB at 08:19

## 2021-08-14 RX ADMIN — LISINOPRIL 10 MG: 10 TABLET ORAL at 08:19

## 2021-08-14 RX ADMIN — HYDROMORPHONE HYDROCHLORIDE 0.5 MG: 1 INJECTION, SOLUTION INTRAMUSCULAR; INTRAVENOUS; SUBCUTANEOUS at 00:12

## 2021-08-14 RX ADMIN — ENOXAPARIN SODIUM 30 MG: 30 INJECTION SUBCUTANEOUS at 08:18

## 2021-08-14 RX ADMIN — OXYCODONE HYDROCHLORIDE 10 MG: 10 TABLET ORAL at 10:15
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
## 2021-08-14 NOTE — ASSESSMENT & PLAN NOTE
- Patient presented with acute alcohol intoxication on admission   - Continue thiamine and folate  - Patient was initially endorsing suicidal thoughts per EMS  - Once patient became sober, she was asked about suicidal ideation, and she clearly without any hesitation stated absolutely not  She stated her  is a  and comes home for the weekend  Stops at home then goes stays with his girlfriend  This has been the situation for some time  She denies suicidal ideations, denies any threats from  or being afraid of him  - Patient admits that she only drinks alcohol on the weekends, and feel that she does not have a problem with her alcohol consumption   - Behavioral health consulted for evaluation and recommendations; patient is agreeable to consult

## 2021-08-14 NOTE — ASSESSMENT & PLAN NOTE
- Right periorbital facial contusion, present on presentation     - Continue analgesia as needed  - May use ice for swelling and pain
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
## 2021-08-14 NOTE — ASSESSMENT & PLAN NOTE
- Right clavicle fracture, present on admission   - Appreciate Orthopedic surgery evaluation and recommendations  - Maintain non-weightbearing status on the right upper extremity in sling   - Monitor right upper extremity neurovascular exam   - Continue multimodal analgesic regimen   - Continue DVT prophylaxis  - PT and OT evaluation and treatment as indicated  - Outpatient follow up with Orthopedic surgery for re-evaluation

## 2021-08-14 NOTE — ASSESSMENT & PLAN NOTE
- Multiple right-sided rib fractures (2-7), present on admission   - Continue rib fracture protocol   - Continue to encourage incentive spirometer use and adequate pulmonary hygiene  Currently pulling 1,750 mL on I S   - Continue multimodal analgesic regimen  Appreciate APS evaluation and recommendations   - Supplemental oxygen via nasal cannula as needed to maintain saturations greater than or equal to 94%  - Repeat chest x-ray from 8/9/2021 reviewed  - PT and OT evaluation and treatment as indicated  - Outpatient follow-up in the trauma clinic for re-evaluation

## 2021-08-14 NOTE — ASSESSMENT & PLAN NOTE
- Patient reported that she took medications for anxiety before she lost insurance, does not remember the name of the medication   - Trial lexapro and await Behavioral Health recommendations  - Behavioral health consulted for evaluation and recommendations; patient is agreeable to consult   - Recommend outpatient follow-up with primary care provider
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
## 2021-08-14 NOTE — ASSESSMENT & PLAN NOTE
- Patient was incidentally found to have lobular wall thickening of the sigmoid colon, colitis versus mass  - She will likely need colonoscopy referral to rule out cancer   - Review of incidental findings   - Outpatient follow-up with PCP

## 2021-08-14 NOTE — ASSESSMENT & PLAN NOTE
- Patient reports she has history of HTN and that she used to take Lisinopril before she lost her insurance  - Continue current medication regimen  - Monitor blood pressures  - Outpatient follow-up with PCP encouraged

## 2021-08-15 PROCEDURE — 99232 SBSQ HOSP IP/OBS MODERATE 35: CPT | Performed by: SURGERY

## 2021-08-15 RX ADMIN — ENOXAPARIN SODIUM 30 MG: 30 INJECTION SUBCUTANEOUS at 22:31

## 2021-08-15 RX ADMIN — FOLIC ACID 1 MG: 1 TABLET ORAL at 08:13

## 2021-08-15 RX ADMIN — ACETAMINOPHEN 975 MG: 325 TABLET, FILM COATED ORAL at 22:31

## 2021-08-15 RX ADMIN — METHOCARBAMOL 750 MG: 500 TABLET, FILM COATED ORAL at 23:14

## 2021-08-15 RX ADMIN — METHOCARBAMOL 750 MG: 500 TABLET, FILM COATED ORAL at 05:37

## 2021-08-15 RX ADMIN — GABAPENTIN 300 MG: 300 CAPSULE ORAL at 22:31

## 2021-08-15 RX ADMIN — METHOCARBAMOL 750 MG: 500 TABLET, FILM COATED ORAL at 00:14

## 2021-08-15 RX ADMIN — ESCITALOPRAM OXALATE 10 MG: 10 TABLET ORAL at 08:13

## 2021-08-15 RX ADMIN — LORAZEPAM 0.5 MG: 0.5 TABLET ORAL at 19:13

## 2021-08-15 RX ADMIN — ACETAMINOPHEN 975 MG: 325 TABLET, FILM COATED ORAL at 13:20

## 2021-08-15 RX ADMIN — THIAMINE HCL TAB 100 MG 100 MG: 100 TAB at 08:13

## 2021-08-15 RX ADMIN — LIDOCAINE 1 PATCH: 50 PATCH TOPICAL at 08:12

## 2021-08-15 RX ADMIN — METHOCARBAMOL 750 MG: 500 TABLET, FILM COATED ORAL at 13:20

## 2021-08-15 RX ADMIN — HYDROMORPHONE HYDROCHLORIDE 0.5 MG: 1 INJECTION, SOLUTION INTRAMUSCULAR; INTRAVENOUS; SUBCUTANEOUS at 15:54

## 2021-08-15 RX ADMIN — OXYCODONE HYDROCHLORIDE 10 MG: 10 TABLET ORAL at 03:40

## 2021-08-15 RX ADMIN — OXYCODONE HYDROCHLORIDE 10 MG: 10 TABLET ORAL at 08:13

## 2021-08-15 RX ADMIN — OXYCODONE HYDROCHLORIDE 10 MG: 10 TABLET ORAL at 13:20

## 2021-08-15 RX ADMIN — ACETAMINOPHEN 975 MG: 325 TABLET, FILM COATED ORAL at 05:37

## 2021-08-15 RX ADMIN — LISINOPRIL 10 MG: 10 TABLET ORAL at 08:16

## 2021-08-15 RX ADMIN — SENNOSIDES 17.2 MG: 8.6 TABLET ORAL at 08:13

## 2021-08-15 RX ADMIN — HYDROMORPHONE HYDROCHLORIDE 0.5 MG: 1 INJECTION, SOLUTION INTRAMUSCULAR; INTRAVENOUS; SUBCUTANEOUS at 20:36

## 2021-08-15 RX ADMIN — OXYCODONE HYDROCHLORIDE 10 MG: 10 TABLET ORAL at 23:15

## 2021-08-15 RX ADMIN — LORAZEPAM 0.5 MG: 0.5 TABLET ORAL at 10:26

## 2021-08-15 RX ADMIN — OXYCODONE HYDROCHLORIDE 10 MG: 10 TABLET ORAL at 17:22

## 2021-08-15 RX ADMIN — ENOXAPARIN SODIUM 30 MG: 30 INJECTION SUBCUTANEOUS at 08:36

## 2021-08-15 RX ADMIN — METHOCARBAMOL 750 MG: 500 TABLET, FILM COATED ORAL at 17:22

## 2021-08-15 NOTE — ASSESSMENT & PLAN NOTE
- Multiple right-sided rib fractures (2-7), present on admission   - Continue rib fracture protocol   - Continue to encourage incentive spirometer use and adequate pulmonary hygiene  Currently pulling 2,000 mL on I S   - Continue multimodal analgesic regimen  Appreciate APS evaluation and recommendations   - Supplemental oxygen via nasal cannula as needed to maintain saturations greater than or equal to 94%  - Repeat chest x-ray from 8/9/2021 reviewed  - PT and OT evaluation and treatment as indicated  - Outpatient follow-up in the trauma clinic for re-evaluation
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
## 2021-08-16 PROCEDURE — 99232 SBSQ HOSP IP/OBS MODERATE 35: CPT | Performed by: SURGERY

## 2021-08-16 PROCEDURE — 99253 IP/OBS CNSLTJ NEW/EST LOW 45: CPT | Performed by: PSYCHIATRY & NEUROLOGY

## 2021-08-16 RX ADMIN — HYDROMORPHONE HYDROCHLORIDE 0.5 MG: 1 INJECTION, SOLUTION INTRAMUSCULAR; INTRAVENOUS; SUBCUTANEOUS at 05:37

## 2021-08-16 RX ADMIN — LORAZEPAM 0.5 MG: 0.5 TABLET ORAL at 19:56

## 2021-08-16 RX ADMIN — ESCITALOPRAM OXALATE 10 MG: 10 TABLET ORAL at 09:08

## 2021-08-16 RX ADMIN — ENOXAPARIN SODIUM 30 MG: 30 INJECTION SUBCUTANEOUS at 09:09

## 2021-08-16 RX ADMIN — ACETAMINOPHEN 975 MG: 325 TABLET, FILM COATED ORAL at 13:31

## 2021-08-16 RX ADMIN — ACETAMINOPHEN 975 MG: 325 TABLET, FILM COATED ORAL at 22:02

## 2021-08-16 RX ADMIN — GABAPENTIN 300 MG: 300 CAPSULE ORAL at 22:02

## 2021-08-16 RX ADMIN — LORAZEPAM 0.5 MG: 0.5 TABLET ORAL at 10:23

## 2021-08-16 RX ADMIN — LISINOPRIL 10 MG: 10 TABLET ORAL at 09:08

## 2021-08-16 RX ADMIN — ACETAMINOPHEN 975 MG: 325 TABLET, FILM COATED ORAL at 05:37

## 2021-08-16 RX ADMIN — OXYCODONE HYDROCHLORIDE 10 MG: 10 TABLET ORAL at 09:08

## 2021-08-16 RX ADMIN — OXYCODONE HYDROCHLORIDE 10 MG: 10 TABLET ORAL at 17:30

## 2021-08-16 RX ADMIN — METHOCARBAMOL 750 MG: 500 TABLET, FILM COATED ORAL at 11:55

## 2021-08-16 RX ADMIN — THIAMINE HCL TAB 100 MG 100 MG: 100 TAB at 09:08

## 2021-08-16 RX ADMIN — METHOCARBAMOL 750 MG: 500 TABLET, FILM COATED ORAL at 17:30

## 2021-08-16 RX ADMIN — OXYCODONE HYDROCHLORIDE 10 MG: 10 TABLET ORAL at 02:06

## 2021-08-16 RX ADMIN — OXYCODONE HYDROCHLORIDE 10 MG: 10 TABLET ORAL at 22:02

## 2021-08-16 RX ADMIN — METHOCARBAMOL 750 MG: 500 TABLET, FILM COATED ORAL at 05:37

## 2021-08-16 RX ADMIN — OXYCODONE HYDROCHLORIDE 10 MG: 10 TABLET ORAL at 13:31

## 2021-08-16 RX ADMIN — FOLIC ACID 1 MG: 1 TABLET ORAL at 09:08

## 2021-08-16 RX ADMIN — LIDOCAINE 1 PATCH: 50 PATCH TOPICAL at 09:07

## 2021-08-16 RX ADMIN — ENOXAPARIN SODIUM 30 MG: 30 INJECTION SUBCUTANEOUS at 22:02

## 2021-08-16 NOTE — ASSESSMENT & PLAN NOTE
- Patient reported that she took medications for anxiety before she lost insurance, does not remember the name of the medication   - Continue Lexapro; appreciate Behavioral Health recommendations  - Behavioral Health evaluation and recommendations appreciated  Recommend alcohol rehabilitation following medical and physical rehabilitation   - Recommend outpatient follow-up with primary care provider

## 2021-08-16 NOTE — CASE MANAGEMENT
Cm received call from [facility] of [address] who would like an update when pt is ready for d/c

## 2021-08-16 NOTE — ASSESSMENT & PLAN NOTE
- Patient presented with acute alcohol intoxication on admission   - Continue thiamine and folate  - Patient was initially endorsing suicidal thoughts per EMS  - Once patient became sober, she was asked about suicidal ideation, and she clearly without any hesitation stated absolutely not  She stated her  is a  and comes home for the weekend  Stops at home then goes stays with his girlfriend  This has been the situation for some time  She denies suicidal ideations, denies any threats from  or being afraid of him  - Patient admits that she only drinks alcohol on the weekends, and feel that she does not have a problem with her alcohol consumption   - Behavioral Health evaluation and recommendations appreciated  Recommend alcohol rehabilitation following medical and physical rehabilitation

## 2021-08-16 NOTE — CASE MANAGEMENT
Pt is accepted to [facility] but they need to speak to the pt's sister Ulises Cotto [phone] about finances and signing the pt in  Pt will also need to be cleared by Psych prior to arrival
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
## 2021-08-17 PROCEDURE — 99232 SBSQ HOSP IP/OBS MODERATE 35: CPT | Performed by: SURGERY

## 2021-08-17 RX ADMIN — GABAPENTIN 300 MG: 300 CAPSULE ORAL at 21:05

## 2021-08-17 RX ADMIN — ACETAMINOPHEN 975 MG: 325 TABLET, FILM COATED ORAL at 05:43

## 2021-08-17 RX ADMIN — SENNOSIDES 17.2 MG: 8.6 TABLET ORAL at 08:02

## 2021-08-17 RX ADMIN — METHOCARBAMOL 750 MG: 500 TABLET, FILM COATED ORAL at 17:17

## 2021-08-17 RX ADMIN — ACETAMINOPHEN 975 MG: 325 TABLET, FILM COATED ORAL at 21:05

## 2021-08-17 RX ADMIN — OXYCODONE HYDROCHLORIDE 10 MG: 10 TABLET ORAL at 13:00

## 2021-08-17 RX ADMIN — OXYCODONE HYDROCHLORIDE 10 MG: 10 TABLET ORAL at 21:18

## 2021-08-17 RX ADMIN — LIDOCAINE 1 PATCH: 50 PATCH TOPICAL at 08:01

## 2021-08-17 RX ADMIN — THIAMINE HCL TAB 100 MG 100 MG: 100 TAB at 08:02

## 2021-08-17 RX ADMIN — ESCITALOPRAM OXALATE 10 MG: 10 TABLET ORAL at 08:02

## 2021-08-17 RX ADMIN — LORAZEPAM 0.5 MG: 0.5 TABLET ORAL at 10:51

## 2021-08-17 RX ADMIN — OXYCODONE HYDROCHLORIDE 10 MG: 10 TABLET ORAL at 03:05

## 2021-08-17 RX ADMIN — OXYCODONE HYDROCHLORIDE 10 MG: 10 TABLET ORAL at 17:17

## 2021-08-17 RX ADMIN — OXYCODONE HYDROCHLORIDE 10 MG: 10 TABLET ORAL at 08:02

## 2021-08-17 RX ADMIN — METHOCARBAMOL 750 MG: 500 TABLET, FILM COATED ORAL at 23:28

## 2021-08-17 RX ADMIN — METHOCARBAMOL 750 MG: 500 TABLET, FILM COATED ORAL at 11:05

## 2021-08-17 RX ADMIN — LORAZEPAM 0.5 MG: 0.5 TABLET ORAL at 18:37

## 2021-08-17 RX ADMIN — METHOCARBAMOL 750 MG: 500 TABLET, FILM COATED ORAL at 05:43

## 2021-08-17 RX ADMIN — METHOCARBAMOL 750 MG: 500 TABLET, FILM COATED ORAL at 00:23

## 2021-08-17 RX ADMIN — FOLIC ACID 1 MG: 1 TABLET ORAL at 08:02

## 2021-08-17 RX ADMIN — ACETAMINOPHEN 975 MG: 325 TABLET, FILM COATED ORAL at 13:00

## 2021-08-17 RX ADMIN — LISINOPRIL 10 MG: 10 TABLET ORAL at 08:02

## 2021-08-17 RX ADMIN — ENOXAPARIN SODIUM 30 MG: 30 INJECTION SUBCUTANEOUS at 08:04

## 2021-08-17 RX ADMIN — ENOXAPARIN SODIUM 30 MG: 30 INJECTION SUBCUTANEOUS at 21:05
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
## 2021-08-17 NOTE — PLAN OF CARE
Problem: Potential for Falls  Goal: Patient will remain free of falls  Description: INTERVENTIONS:  - Educate patient/family on patient safety including physical limitations  - Instruct patient to call for assistance with activity   - Consult OT/PT to assist with strengthening/mobility   - Keep Call bell within reach  - Keep bed low and locked with side rails adjusted as appropriate  - Keep care items and personal belongings within reach  - Initiate and maintain comfort rounds  - Make Fall Risk Sign visile to staff  - Offer Toileting every  Hours, in advance of need  - Initiate/Maintain alarm  - Obtain necessary fall risk management equipment:   - Apply yellow socks and bracelet for high fall risk patients  - Consider moving patient to room near nurses station  Outcome: Progressing     Problem: DISCHARGE PLANNING - CARE MANAGEMENT  Goal: Discharge to post-acute care or home with appropriate resources  Description: INTERVENTIONS:  - Conduct assessment to determine patient/family and health care team treatment goals, and need for post-acute services based on payer coverage, community resources, and patient preferences, and barriers to discharge  - Address psychosocial, clinical, and financial barriers to discharge as identified in assessment in conjunction with the patient/family and health care team  - Arrange appropriate level of post-acute services according to patients   needs and preference and payer coverage in collaboration with the physician and health care team  - Communicate with and update the patient/family, physician, and health care team regarding progress on the discharge plan  - Arrange appropriate transportation to post-acute venues  Outcome: Progressing     Problem: PAIN - ADULT  Goal: Verbalizes/displays adequate comfort level or baseline comfort level  Description: Interventions:  - Encourage patient to monitor pain and request assistance  - Assess pain using appropriate pain scale  - Administer analgesics based on type and severity of pain and evaluate response  - Implement non-pharmacological measures as appropriate and evaluate response  - Consider cultural and social influences on pain and pain management  - Notify physician/advanced practitioner if interventions unsuccessful or patient reports new pain  Outcome: Progressing
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
## 2021-08-18 LAB
ANION GAP SERPL CALCULATED.3IONS-SCNC: 7 MMOL/L (ref 4–13)
BASOPHILS # BLD AUTO: 0.05 THOUSANDS/ΜL (ref 0–0.1)
BASOPHILS NFR BLD AUTO: 1 % (ref 0–1)
BUN SERPL-MCNC: 8 MG/DL (ref 5–25)
CALCIUM SERPL-MCNC: 9.5 MG/DL (ref 8.3–10.1)
CHLORIDE SERPL-SCNC: 105 MMOL/L (ref 100–108)
CO2 SERPL-SCNC: 27 MMOL/L (ref 21–32)
CREAT SERPL-MCNC: 0.23 MG/DL (ref 0.6–1.3)
EOSINOPHIL # BLD AUTO: 0.1 THOUSAND/ΜL (ref 0–0.61)
EOSINOPHIL NFR BLD AUTO: 1 % (ref 0–6)
ERYTHROCYTE [DISTWIDTH] IN BLOOD BY AUTOMATED COUNT: 16.5 % (ref 11.6–15.1)
GFR SERPL CREATININE-BSD FRML MDRD: 145 ML/MIN/1.73SQ M
GLUCOSE SERPL-MCNC: 98 MG/DL (ref 65–140)
HCT VFR BLD AUTO: 35 % (ref 34.8–46.1)
HGB BLD-MCNC: 11.5 G/DL (ref 11.5–15.4)
IMM GRANULOCYTES # BLD AUTO: 0.03 THOUSAND/UL (ref 0–0.2)
IMM GRANULOCYTES NFR BLD AUTO: 0 % (ref 0–2)
LYMPHOCYTES # BLD AUTO: 1.63 THOUSANDS/ΜL (ref 0.6–4.47)
LYMPHOCYTES NFR BLD AUTO: 22 % (ref 14–44)
MCH RBC QN AUTO: 32.2 PG (ref 26.8–34.3)
MCHC RBC AUTO-ENTMCNC: 32.9 G/DL (ref 31.4–37.4)
MCV RBC AUTO: 98 FL (ref 82–98)
MONOCYTES # BLD AUTO: 1.11 THOUSAND/ΜL (ref 0.17–1.22)
MONOCYTES NFR BLD AUTO: 15 % (ref 4–12)
NEUTROPHILS # BLD AUTO: 4.37 THOUSANDS/ΜL (ref 1.85–7.62)
NEUTS SEG NFR BLD AUTO: 61 % (ref 43–75)
NRBC BLD AUTO-RTO: 0 /100 WBCS
PLATELET # BLD AUTO: 439 THOUSANDS/UL (ref 149–390)
PMV BLD AUTO: 10.6 FL (ref 8.9–12.7)
POTASSIUM SERPL-SCNC: 3.9 MMOL/L (ref 3.5–5.3)
RBC # BLD AUTO: 3.57 MILLION/UL (ref 3.81–5.12)
SODIUM SERPL-SCNC: 139 MMOL/L (ref 136–145)
WBC # BLD AUTO: 7.29 THOUSAND/UL (ref 4.31–10.16)

## 2021-08-18 PROCEDURE — 80048 BASIC METABOLIC PNL TOTAL CA: CPT | Performed by: EMERGENCY MEDICINE

## 2021-08-18 PROCEDURE — 85025 COMPLETE CBC W/AUTO DIFF WBC: CPT | Performed by: EMERGENCY MEDICINE

## 2021-08-18 PROCEDURE — 97116 GAIT TRAINING THERAPY: CPT

## 2021-08-18 PROCEDURE — 97530 THERAPEUTIC ACTIVITIES: CPT

## 2021-08-18 PROCEDURE — 99232 SBSQ HOSP IP/OBS MODERATE 35: CPT | Performed by: SURGERY

## 2021-08-18 PROCEDURE — 97535 SELF CARE MNGMENT TRAINING: CPT

## 2021-08-18 RX ADMIN — LISINOPRIL 10 MG: 10 TABLET ORAL at 08:48

## 2021-08-18 RX ADMIN — LORAZEPAM 0.5 MG: 0.5 TABLET ORAL at 14:47

## 2021-08-18 RX ADMIN — METHOCARBAMOL 750 MG: 500 TABLET, FILM COATED ORAL at 17:50

## 2021-08-18 RX ADMIN — OXYCODONE HYDROCHLORIDE 10 MG: 10 TABLET ORAL at 08:49

## 2021-08-18 RX ADMIN — ESCITALOPRAM OXALATE 10 MG: 10 TABLET ORAL at 08:49

## 2021-08-18 RX ADMIN — ENOXAPARIN SODIUM 30 MG: 30 INJECTION SUBCUTANEOUS at 08:49

## 2021-08-18 RX ADMIN — METHOCARBAMOL 750 MG: 500 TABLET, FILM COATED ORAL at 12:52

## 2021-08-18 RX ADMIN — THIAMINE HCL TAB 100 MG 100 MG: 100 TAB at 08:49

## 2021-08-18 RX ADMIN — ACETAMINOPHEN 975 MG: 325 TABLET, FILM COATED ORAL at 05:45

## 2021-08-18 RX ADMIN — LIDOCAINE 1 PATCH: 50 PATCH TOPICAL at 08:49

## 2021-08-18 RX ADMIN — ACETAMINOPHEN 975 MG: 325 TABLET, FILM COATED ORAL at 12:52

## 2021-08-18 RX ADMIN — LORAZEPAM 0.5 MG: 0.5 TABLET ORAL at 08:49

## 2021-08-18 RX ADMIN — LORAZEPAM 0.5 MG: 0.5 TABLET ORAL at 00:20

## 2021-08-18 RX ADMIN — GABAPENTIN 300 MG: 300 CAPSULE ORAL at 21:16

## 2021-08-18 RX ADMIN — OXYCODONE HYDROCHLORIDE 10 MG: 10 TABLET ORAL at 17:50

## 2021-08-18 RX ADMIN — OXYCODONE HYDROCHLORIDE 10 MG: 10 TABLET ORAL at 12:52

## 2021-08-18 RX ADMIN — FOLIC ACID 1 MG: 1 TABLET ORAL at 08:48

## 2021-08-18 RX ADMIN — METHOCARBAMOL 750 MG: 500 TABLET, FILM COATED ORAL at 05:45

## 2021-08-18 RX ADMIN — ACETAMINOPHEN 975 MG: 325 TABLET, FILM COATED ORAL at 21:16

## 2021-08-18 RX ADMIN — LORAZEPAM 0.5 MG: 0.5 TABLET ORAL at 21:16

## 2021-08-18 RX ADMIN — OXYCODONE HYDROCHLORIDE 10 MG: 10 TABLET ORAL at 22:42

## 2021-08-18 RX ADMIN — ENOXAPARIN SODIUM 30 MG: 30 INJECTION SUBCUTANEOUS at 21:16

## 2021-08-18 RX ADMIN — OXYCODONE HYDROCHLORIDE 10 MG: 10 TABLET ORAL at 04:20

## 2021-08-18 NOTE — PHYSICAL THERAPY NOTE
Physical Therapy Progress Note     08/18/21 0941   PT Last Visit   PT Visit Date 08/18/21   Note Type   Note Type Treatment   Pain Assessment   Pain Assessment Tool FLACC   Pain Rating: FLACC (Rest) - Face 1   Pain Rating: FLACC (Rest) - Legs 0   Pain Rating: FLACC (Rest) - Activity 1   Pain Rating: FLACC (Rest) - Cry 1   Pain Rating: FLACC (Rest) - Consolability 0   Score: FLACC (Rest) 3   Pain Rating: FLACC (Activity) - Face 1   Pain Rating: FLACC (Activity) - Legs 1   Pain Rating: FLACC (Activity) - Activity 1   Pain Rating: FLACC (Activity) - Cry 1   Pain Rating: FLACC (Activity) - Consolability 1   Score: FLACC (Activity) 5   Restrictions/Precautions   RUE Weight Bearing Per Order NWB   Braces or Orthoses Sling   Other Precautions WBS;Pain; Fall Risk   Subjective   Subjective Pt encountred seated EOB, pleasant and agreeable to treatment  Reports continued pain in ribs, but states she has been able to ambulate to & from bathroom without assist   No complaints noted with activity  Observed pt has vape pen in glasses case when assisting pt with belongings during [facility]  Pt reports it is nicotine  RN informed  Transfers   Sit to Stand 5  Supervision   Additional items Assist x 1; Armrests; Increased time required   Stand to Sit 5  Supervision   Additional items Assist x 1; Armrests; Increased time required;Verbal cues   Ambulation/Elevation   Gait pattern Excessively slow; Short stride; Inconsistent marcela;Decreased foot clearance; Improper Weight shift; Antalgic  (guarded posture)   Gait Assistance 4  Minimal assist   Additional items Assist x 1   Assistive Device SPC   Distance 180', 120'   Balance   Static Sitting Fair +   Static Standing Fair -   Ambulatory Poor +   Endurance Deficit   Endurance Deficit Yes   Endurance Deficit Description pain, fatigue   Activity Tolerance   Activity Tolerance Patient tolerated treatment well;Patient limited by fatigue;Patient limited by pain   Nurse Made Aware yes   Assessment Prognosis Good   Problem List Decreased strength;Decreased endurance; Impaired balance;Decreased mobility; Decreased coordination; Impaired judgement;Decreased cognition;Pain;Orthopedic restrictions;Decreased safety awareness;Decreased range of motion   Assessment Pt demonstrated improved functional endurance this session, ambulating up to community distances prior to requiring seated rests to recover after each trial   Pt perorms all tasks slowly with guarded posture thorughout, but demonstrated improved relaxation after instrutions & visual demonstration prior to 2nd gait trial   Anticipate pt will make continued progress as pain resides  Cotninue to recommend rehab at this time due to impaired balance as a result of this guarded posture, limited endurance compared to baseline & questions regarding home disposition per CM notes  Goals   Patient Goals to have less pain   STG Expiration Date 08/20/21   PT Treatment Day 2   Plan   Treatment/Interventions Functional transfer training;LE strengthening/ROM; Elevations; Therapeutic exercise; Endurance training;Patient/family training;Equipment eval/education; Bed mobility;Gait training   Progress Progressing toward goals   PT Frequency Other (Comment)  (3-6x/week)   Recommendation   PT Discharge Recommendation Post acute rehabilitation services   Equipment Recommended Cane   [facility] Basic Mobility Inpatient   Turning in Bed Without Bedrails 4   Lying on Back to Sitting on Edge of Flat Bed 4   Moving Bed to Chair 4   Standing Up From Chair 4   Walk in Room 3   Climb 3-5 Stairs 3   Basic Mobility Inpatient Raw Score 22   Basic Mobility Standardized Score 47 4   The patient's [facility] Basic Mobility Inpatient Short Form Raw Score is 14, Standardized Score is 35 55  A standardized score less than 42 9 suggests the patient may benefit from discharge to post-acute rehabilitation services   Please also refer to the recommendation of the Physical Therapist for safe discharge planning            Jerilee Gain, PTA

## 2021-08-18 NOTE — PLAN OF CARE
Problem: Potential for Falls  Goal: Patient will remain free of falls  Description: INTERVENTIONS:  - Educate patient/family on patient safety including physical limitations  - Instruct patient to call for assistance with activity   - Consult OT/PT to assist with strengthening/mobility   - Keep Call bell within reach  - Keep bed low and locked with side rails adjusted as appropriate  - Keep care items and personal belongings within reach  - Initiate and maintain comfort rounds  - Make Fall Risk Sign visible to staff  - Obtain necessary fall risk management equipment  - Apply yellow socks and bracelet for high fall risk patients  - Consider moving patient to room near nurses station  Outcome: Progressing     Problem: DISCHARGE PLANNING - CARE MANAGEMENT  Goal: Discharge to post-acute care or home with appropriate resources  Description: INTERVENTIONS:  - Conduct assessment to determine patient/family and health care team treatment goals, and need for post-acute services based on payer coverage, community resources, and patient preferences, and barriers to discharge  - Address psychosocial, clinical, and financial barriers to discharge as identified in assessment in conjunction with the patient/family and health care team  - Arrange appropriate level of post-acute services according to patients   needs and preference and payer coverage in collaboration with the physician and health care team  - Communicate with and update the patient/family, physician, and health care team regarding progress on the discharge plan  - Arrange appropriate transportation to post-acute venues  Outcome: Progressing     Problem: PAIN - ADULT  Goal: Verbalizes/displays adequate comfort level or baseline comfort level  Description: Interventions:  - Encourage patient to monitor pain and request assistance  - Assess pain using appropriate pain scale  - Administer analgesics based on type and severity of pain and evaluate response  - Implement non-pharmacological measures as appropriate and evaluate response  - Consider cultural and social influences on pain and pain management  - Notify physician/advanced practitioner if interventions unsuccessful or patient reports new pain  Outcome: Progressing     Problem: Knowledge Deficit  Goal: Patient/family/caregiver demonstrates understanding of disease process, treatment plan, medications, and discharge instructions  Description: Complete learning assessment and assess knowledge base    Interventions:  - Provide teaching at level of understanding  - Provide teaching via preferred learning methods  Outcome: Progressing     Problem: SKIN/TISSUE INTEGRITY - ADULT  Goal: Incision(s), wounds(s) or drain site(s) healing without S/S of infection  Description: INTERVENTIONS  - Assess and document dressing, incision, wound bed, drain sites and surrounding tissue  - Provide patient and family education  Outcome: Progressing     Problem: MUSCULOSKELETAL - ADULT  Goal: Maintain or return mobility to safest level of function  Description: INTERVENTIONS:  - Assess patient's ability to carry out ADLs; assess patient's baseline for ADL function and identify physical deficits which impact ability to perform ADLs (bathing, care of mouth/teeth, toileting, grooming, dressing, etc )  - Assess/evaluate cause of self-care deficits   - Assess range of motion  - Assess patient's mobility  - Assess patient's need for assistive devices and provide as appropriate  - Encourage maximum independence but intervene and supervise when necessary  - Involve family in performance of ADLs  - Assess for home care needs following discharge   - Consider OT consult to assist with ADL evaluation and planning for discharge  - Provide patient education as appropriate  Outcome: Progressing     Problem: MOBILITY - ADULT  Goal: Maintain or return to baseline ADL function  Description: INTERVENTIONS:  -  Assess patient's ability to carry out ADLs; assess patient's baseline for ADL function and identify physical deficits which impact ability to perform ADLs (bathing, care of mouth/teeth, toileting, grooming, dressing, etc )  - Assess/evaluate cause of self-care deficits   - Assess range of motion  - Assess patient's mobility; develop plan if impaired  - Assess patient's need for assistive devices and provide as appropriate  - Encourage maximum independence but intervene and supervise when necessary  - Involve family in performance of ADLs  - Assess for home care needs following discharge   - Consider OT consult to assist with ADL evaluation and planning for discharge  - Provide patient education as appropriate  Outcome: Progressing  Goal: Maintains/Returns to pre admission functional level  Description: INTERVENTIONS:  - Perform BMAT or MOVE assessment daily    - Set and communicate daily mobility goal to care team and patient/family/caregiver  - Collaborate with rehabilitation services on mobility goals if consulted  - Out of bed for toileting  - Record patient progress and toleration of activity level   Outcome: Progressing     Problem: Nutrition/Hydration-ADULT  Goal: Nutrient/Hydration intake appropriate for improving, restoring or maintaining nutritional needs  Description: Monitor and assess patient's nutrition/hydration status for malnutrition  Collaborate with interdisciplinary team and initiate plan and interventions as ordered  Monitor patient's weight and dietary intake as ordered or per policy  Utilize nutrition screening tool and intervene as necessary  Determine patient's food preferences and provide high-protein, high-caloric foods as appropriate       INTERVENTIONS:  - Monitor oral intake, urinary output, labs, and treatment plans  - Assess nutrition and hydration status and recommend course of action  - Evaluate amount of meals eaten  - Assist patient with eating if necessary   - Allow adequate time for meals  - Recommend/ encourage appropriate diets, oral nutritional supplements, and vitamin/mineral supplements  - Order, calculate, and assess calorie counts as needed  - Recommend, monitor, and adjust tube feedings and TPN/PPN based on assessed needs  - Assess need for intravenous fluids  - Provide specific nutrition/hydration education as appropriate  - Include patient/family/caregiver in decisions related to nutrition  Outcome: Progressing

## 2021-08-18 NOTE — CASE MANAGEMENT
Both [facility] (pt filling out financial information for them) and [facility] are interested but neither have given a definitive yes  CM will follow with facilities
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
## 2021-08-18 NOTE — PLAN OF CARE
Problem: Potential for Falls  Goal: Patient will remain free of falls  Description: INTERVENTIONS:  - Educate patient/family on patient safety including physical limitations  - Instruct patient to call for assistance with activity   - Consult OT/PT to assist with strengthening/mobility   - Keep Call bell within reach  - Keep bed low and locked with side rails adjusted as appropriate  - Keep care items and personal belongings within reach  - Initiate and maintain comfort rounds  - Make Fall Risk Sign visible to staff  - Offer Toileting every Hours, in advance of need  - Initiate/Maintain alarm  - Obtain necessary fall risk management equipment:   - Apply yellow socks and bracelet for high fall risk patients  - Consider moving patient to room near nurses station  Outcome: Progressing     Problem: DISCHARGE PLANNING - CARE MANAGEMENT  Goal: Discharge to post-acute care or home with appropriate resources  Description: INTERVENTIONS:  - Conduct assessment to determine patient/family and health care team treatment goals, and need for post-acute services based on payer coverage, community resources, and patient preferences, and barriers to discharge  - Address psychosocial, clinical, and financial barriers to discharge as identified in assessment in conjunction with the patient/family and health care team  - Arrange appropriate level of post-acute services according to patients   needs and preference and payer coverage in collaboration with the physician and health care team  - Communicate with and update the patient/family, physician, and health care team regarding progress on the discharge plan  - Arrange appropriate transportation to post-acute venues  Outcome: Progressing

## 2021-08-18 NOTE — OCCUPATIONAL THERAPY NOTE
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 [employer] c  no a d )   Transfers   Sit to Stand 5  Supervision   Stand to Sit 5  Supervision   Functional Mobility   Functional Mobility 5  Supervision   Additional Comments pt walked within hospital room with improved steadiness and balance compared to previous sessoin  Additional items   (spc)   Cognition   Overall Cognitive Status WFL   Activity Tolerance   Activity Tolerance Patient tolerated treatment well   Assessment   Assessment pt participated in pm ot session and was seen focusing on dynamic balance and light homemaking activity  pt was able to carry few towels after sitting to fold same  pt had no lob while carring to   cabinet  pt placed into top drawer of cabinet  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  pt continues to fee in pt rehab is in her best intrest inorder to reduce pain, incrase indepdnence with adls and iadls  and functional mobility   Plan   Treatment Interventions ADL retraining;Functional transfer training; Endurance training;Patient/family training;Equipment evaluation/education; Energy conservation; Activityengagement   Goal Expiration Date 08/24/21   OT Treatment Day 2   OT Frequency 3-5x/wk   Recommendation   OT Discharge Recommendation Post acute rehabilitation services   OT - OK to Discharge Yes   AM-PAC Daily Activity Inpatient   Lower Body Dressing 3   Bathing 3   Toileting 3   Upper Body Dressing 3   Grooming 3   Eating 3   Daily Activity Raw Score 18   Daily Activity Standardized Score (Calc for Raw Score >=11) 38 66   AM-PAC Applied Cognition Inpatient   Following a Speech/Presentation 3   Understanding Ordinary Conversation 4   Taking Medications 3   Remembering Where Things Are Placed or Put Away 4   Remembering List of 4-5 Errands 4   Taking Care of Complicated Tasks 3   Applied Cognition Raw Score 21   Applied Cognition Standardized Score 44 3        08/18/21 1502   OT Last Visit   OT Visit Date 08/18/21   Note Type   Note Type Treatment   Functional Standing Tolerance   Time   (f+ balance while carring laundry to [employer] c  no a d )   Transfers   Sit to Stand 5  Supervision   Stand to Sit 5  Supervision   Functional Mobility   Functional Mobility 5  Supervision   Additional Comments pt walked within hospital room with improved steadiness and balance compared to previous sessoin  Additional items   (spc)   Cognition   Overall Cognitive Status WFL   Activity Tolerance   Activity Tolerance Patient tolerated treatment well   Assessment   Assessment pt participated in pm ot session and was seen focusing on dynamic balance and light homemaking activity  pt was able to carry few towels after sitting to fold same  pt had no lob while carring to   cabinet  pt placed into top drawer of cabinet  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  pt continues to fee in pt rehab is in her best intrest inorder to reduce pain, incrase indepdnence with adls and iadls  and functional mobility   Plan   Treatment Interventions ADL retraining;Functional transfer training; Endurance training;Patient/family training;Equipment evaluation/education; Energy conservation; Activityengagement   Goal Expiration Date 08/24/21   OT Treatment Day 2   OT Frequency 3-5x/wk   Recommendation   OT Discharge Recommendation Post acute rehabilitation services   OT - OK to Discharge Yes   AM-PAC Daily Activity Inpatient   Lower Body Dressing 3   Bathing 3   Toileting 3   Upper Body Dressing 3   Grooming 3   Eating 3   Daily Activity Raw Score 18   Daily Activity Standardized Score (Calc for Raw Score >=11) 38 66   AM-PAC Applied Cognition Inpatient   Following a Speech/Presentation 3   Understanding Ordinary Conversation 4   Taking Medications 3   Remembering Where Things Are Placed or Put Away 4   Remembering List of 4-5 Errands 4   Taking Care of Complicated Tasks 3   Applied Cognition Raw Score 21   Applied Cognition Standardized Score 44 3   April A Storm, GIBBS

## 2021-08-18 NOTE — PLAN OF CARE
Problem: OCCUPATIONAL THERAPY ADULT  Goal: Performs self-care activities at highest level of function for planned discharge setting  See evaluation for individualized goals  Description: Treatment Interventions: ADL retraining, Functional transfer training, Endurance training, Cognitive reorientation, Patient/family training, Equipment evaluation/education, Compensatory technique education, Energy conservation, Activityengagement          See flowsheet documentation for full assessment, interventions and recommendations  Outcome: Progressing  Note: Limitation: Decreased ADL status, Decreased Safe judgement during ADL, Decreased cognition, Decreased endurance, Decreased self-care trans, Decreased high-level ADLs  Prognosis: Good  Assessment: pt participated in pm ot session and was seen focusing on dynamic balance and light homemaking activity  pt was able to carry few towels after sitting to fold same  pt had no lob while carring to   cabinet  pt placed into top drawer of cabinet  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

## 2021-08-18 NOTE — PLAN OF CARE
Problem: PHYSICAL THERAPY ADULT  Goal: Performs mobility at highest level of function for planned discharge setting  See evaluation for individualized goals  Description: Treatment/Interventions: LE strengthening/ROM, Functional transfer training, Elevations, Therapeutic exercise, Endurance training, Patient/family training, Equipment eval/education, Bed mobility, Gait training, Spoke to nursing, OT  Equipment Recommended:  (continue to assess )       See flowsheet documentation for full assessment, interventions and recommendations  Outcome: Progressing  Note: Prognosis: Good  Problem List: Decreased strength, Decreased endurance, Impaired balance, Decreased mobility, Decreased coordination, Impaired judgement, Decreased cognition, Pain, Orthopedic restrictions, Decreased safety awareness, Decreased range of motion  Assessment: Pt demonstrated improved functional endurance this session, ambulating up to community distances prior to requiring seated rests to recover after each trial   Pt perorms all tasks slowly with guarded posture thorughout, but demonstrated improved relaxation after instrutions & visual demonstration prior to 2nd gait trial   Anticipate pt will make continued progress as pain resides  Cotninue to recommend rehab at this time due to impaired balance as a result of this guarded posture, limited endurance compared to baseline & questions regarding home disposition per CM notes  Barriers to Discharge: Inaccessible home environment, Decreased caregiver support        PT Discharge Recommendation: Post acute rehabilitation services     PT - OK to Discharge: Yes    See flowsheet documentation for full assessment

## 2021-08-19 PROCEDURE — 99232 SBSQ HOSP IP/OBS MODERATE 35: CPT | Performed by: SURGERY

## 2021-08-19 RX ORDER — ONDANSETRON 4 MG/1
4 TABLET, ORALLY DISINTEGRATING ORAL EVERY 6 HOURS PRN
Status: DISCONTINUED | OUTPATIENT
Start: 2021-08-19 | End: 2021-08-26 | Stop reason: HOSPADM

## 2021-08-19 RX ADMIN — FOLIC ACID 1 MG: 1 TABLET ORAL at 09:09

## 2021-08-19 RX ADMIN — OXYCODONE HYDROCHLORIDE 10 MG: 10 TABLET ORAL at 03:52

## 2021-08-19 RX ADMIN — OXYCODONE HYDROCHLORIDE 10 MG: 10 TABLET ORAL at 13:42

## 2021-08-19 RX ADMIN — ENOXAPARIN SODIUM 30 MG: 30 INJECTION SUBCUTANEOUS at 21:26

## 2021-08-19 RX ADMIN — METHOCARBAMOL 750 MG: 500 TABLET, FILM COATED ORAL at 17:01

## 2021-08-19 RX ADMIN — ACETAMINOPHEN 975 MG: 325 TABLET, FILM COATED ORAL at 21:27

## 2021-08-19 RX ADMIN — OXYCODONE HYDROCHLORIDE 10 MG: 10 TABLET ORAL at 18:27

## 2021-08-19 RX ADMIN — ENOXAPARIN SODIUM 30 MG: 30 INJECTION SUBCUTANEOUS at 09:08

## 2021-08-19 RX ADMIN — LISINOPRIL 10 MG: 10 TABLET ORAL at 09:09

## 2021-08-19 RX ADMIN — METHOCARBAMOL 750 MG: 500 TABLET, FILM COATED ORAL at 23:07

## 2021-08-19 RX ADMIN — LORAZEPAM 0.5 MG: 0.5 TABLET ORAL at 16:54

## 2021-08-19 RX ADMIN — ACETAMINOPHEN 975 MG: 325 TABLET, FILM COATED ORAL at 05:16

## 2021-08-19 RX ADMIN — METHOCARBAMOL 750 MG: 500 TABLET, FILM COATED ORAL at 13:42

## 2021-08-19 RX ADMIN — LIDOCAINE 1 PATCH: 50 PATCH TOPICAL at 09:09

## 2021-08-19 RX ADMIN — METHOCARBAMOL 750 MG: 500 TABLET, FILM COATED ORAL at 00:28

## 2021-08-19 RX ADMIN — OXYCODONE HYDROCHLORIDE 10 MG: 10 TABLET ORAL at 09:09

## 2021-08-19 RX ADMIN — ACETAMINOPHEN 975 MG: 325 TABLET, FILM COATED ORAL at 13:42

## 2021-08-19 RX ADMIN — ESCITALOPRAM OXALATE 10 MG: 10 TABLET ORAL at 09:08

## 2021-08-19 RX ADMIN — GABAPENTIN 300 MG: 300 CAPSULE ORAL at 21:27

## 2021-08-19 RX ADMIN — METHOCARBAMOL 750 MG: 500 TABLET, FILM COATED ORAL at 05:16

## 2021-08-19 RX ADMIN — THIAMINE HCL TAB 100 MG 100 MG: 100 TAB at 09:09

## 2021-08-19 RX ADMIN — OXYCODONE HYDROCHLORIDE 10 MG: 10 TABLET ORAL at 23:09

## 2021-08-19 RX ADMIN — LORAZEPAM 0.5 MG: 0.5 TABLET ORAL at 10:13
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
## 2021-08-19 NOTE — CASE MANAGEMENT
No accepting SNF   CM placed another 20 referrals to see if someone can accept as MA pending for SNF

## 2021-08-19 NOTE — PLAN OF CARE
Problem: Potential for Falls  Goal: Patient will remain free of falls  Description: INTERVENTIONS:  - Educate patient/family on patient safety including physical limitations  - Instruct patient to call for assistance with activity   - Consult OT/PT to assist with strengthening/mobility   - Keep Call bell within reach  - Keep bed low and locked with side rails adjusted as appropriate  - Keep care items and personal belongings within reach  - Initiate and maintain comfort rounds  - Make Fall Risk Sign visible to staff  - Obtain necessary fall risk management equipment  - Apply yellow socks and bracelet for high fall risk patients  - Consider moving patient to room near nurses station  Outcome: Progressing     Problem: DISCHARGE PLANNING - CARE MANAGEMENT  Goal: Discharge to post-acute care or home with appropriate resources  Description: INTERVENTIONS:  - Conduct assessment to determine patient/family and health care team treatment goals, and need for post-acute services based on payer coverage, community resources, and patient preferences, and barriers to discharge  - Address psychosocial, clinical, and financial barriers to discharge as identified in assessment in conjunction with the patient/family and health care team  - Arrange appropriate level of post-acute services according to patients   needs and preference and payer coverage in collaboration with the physician and health care team  - Communicate with and update the patient/family, physician, and health care team regarding progress on the discharge plan  - Arrange appropriate transportation to post-acute venues  Outcome: Progressing     Problem: PAIN - ADULT  Goal: Verbalizes/displays adequate comfort level or baseline comfort level  Description: Interventions:  - Encourage patient to monitor pain and request assistance  - Assess pain using appropriate pain scale  - Administer analgesics based on type and severity of pain and evaluate response  - Implement non-pharmacological measures as appropriate and evaluate response  - Consider cultural and social influences on pain and pain management  - Notify physician/advanced practitioner if interventions unsuccessful or patient reports new pain  Outcome: Progressing     Problem: Knowledge Deficit  Goal: Patient/family/caregiver demonstrates understanding of disease process, treatment plan, medications, and discharge instructions  Description: Complete learning assessment and assess knowledge base    Interventions:  - Provide teaching at level of understanding  - Provide teaching via preferred learning methods  Outcome: Progressing     Problem: SKIN/TISSUE INTEGRITY - ADULT  Goal: Incision(s), wounds(s) or drain site(s) healing without S/S of infection  Description: INTERVENTIONS  - Assess and document dressing, incision, wound bed, drain sites and surrounding tissue  - Provide patient and family education  - Perform skin care/dressing changes  Outcome: Progressing     Problem: MUSCULOSKELETAL - ADULT  Goal: Maintain or return mobility to safest level of function  Description: INTERVENTIONS:  - Assess patient's ability to carry out ADLs; assess patient's baseline for ADL function and identify physical deficits which impact ability to perform ADLs (bathing, care of mouth/teeth, toileting, grooming, dressing, etc )  - Assess/evaluate cause of self-care deficits   - Assess range of motion  - Assess patient's mobility  - Assess patient's need for assistive devices and provide as appropriate  - Encourage maximum independence but intervene and supervise when necessary  - Involve family in performance of ADLs  - Assess for home care needs following discharge   - Consider OT consult to assist with ADL evaluation and planning for discharge  - Provide patient education as appropriate  Outcome: Progressing     Problem: MOBILITY - ADULT  Goal: Maintain or return to baseline ADL function  Description: INTERVENTIONS:  -  Assess patient's ability to carry out ADLs; assess patient's baseline for ADL function and identify physical deficits which impact ability to perform ADLs (bathing, care of mouth/teeth, toileting, grooming, dressing, etc )  - Assess/evaluate cause of self-care deficits   - Assess range of motion  - Assess patient's mobility; develop plan if impaired  - Assess patient's need for assistive devices and provide as appropriate  - Encourage maximum independence but intervene and supervise when necessary  - Involve family in performance of ADLs  - Assess for home care needs following discharge   - Consider OT consult to assist with ADL evaluation and planning for discharge  - Provide patient education as appropriate  Outcome: Progressing  Goal: Maintains/Returns to pre admission functional level  Description: INTERVENTIONS:  - Perform BMAT or MOVE assessment daily    - Set and communicate daily mobility goal to care team and patient/family/caregiver  - Collaborate with rehabilitation services on mobility goals if consulted  - Out of bed for toileting  - Record patient progress and toleration of activity level   Outcome: Progressing     Problem: Nutrition/Hydration-ADULT  Goal: Nutrient/Hydration intake appropriate for improving, restoring or maintaining nutritional needs  Description: Monitor and assess patient's nutrition/hydration status for malnutrition  Collaborate with interdisciplinary team and initiate plan and interventions as ordered  Monitor patient's weight and dietary intake as ordered or per policy  Utilize nutrition screening tool and intervene as necessary  Determine patient's food preferences and provide high-protein, high-caloric foods as appropriate       INTERVENTIONS:  - Monitor oral intake, urinary output, labs, and treatment plans  - Assess nutrition and hydration status and recommend course of action  - Evaluate amount of meals eaten  - Assist patient with eating if necessary   - Allow adequate time for meals  - Recommend/ encourage appropriate diets, oral nutritional supplements, and vitamin/mineral supplements  - Order, calculate, and assess calorie counts as needed  - Recommend, monitor, and adjust tube feedings and TPN/PPN based on assessed needs  - Assess need for intravenous fluids  - Provide specific nutrition/hydration education as appropriate  - Include patient/family/caregiver in decisions related to nutrition  Outcome: Progressing

## 2021-08-20 PROCEDURE — 97116 GAIT TRAINING THERAPY: CPT

## 2021-08-20 PROCEDURE — 99232 SBSQ HOSP IP/OBS MODERATE 35: CPT | Performed by: SURGERY

## 2021-08-20 RX ADMIN — OXYCODONE HYDROCHLORIDE 10 MG: 10 TABLET ORAL at 09:27

## 2021-08-20 RX ADMIN — LISINOPRIL 10 MG: 10 TABLET ORAL at 09:28

## 2021-08-20 RX ADMIN — OXYCODONE HYDROCHLORIDE 10 MG: 10 TABLET ORAL at 13:34

## 2021-08-20 RX ADMIN — ESCITALOPRAM OXALATE 10 MG: 10 TABLET ORAL at 09:28

## 2021-08-20 RX ADMIN — METHOCARBAMOL 750 MG: 500 TABLET, FILM COATED ORAL at 11:48

## 2021-08-20 RX ADMIN — OXYCODONE HYDROCHLORIDE 10 MG: 10 TABLET ORAL at 21:57

## 2021-08-20 RX ADMIN — METHOCARBAMOL 750 MG: 500 TABLET, FILM COATED ORAL at 23:15

## 2021-08-20 RX ADMIN — ENOXAPARIN SODIUM 30 MG: 30 INJECTION SUBCUTANEOUS at 09:27

## 2021-08-20 RX ADMIN — THIAMINE HCL TAB 100 MG 100 MG: 100 TAB at 09:27

## 2021-08-20 RX ADMIN — ACETAMINOPHEN 975 MG: 325 TABLET, FILM COATED ORAL at 05:10

## 2021-08-20 RX ADMIN — LORAZEPAM 0.5 MG: 0.5 TABLET ORAL at 05:13

## 2021-08-20 RX ADMIN — OXYCODONE HYDROCHLORIDE 10 MG: 10 TABLET ORAL at 17:54

## 2021-08-20 RX ADMIN — METHOCARBAMOL 750 MG: 500 TABLET, FILM COATED ORAL at 17:54

## 2021-08-20 RX ADMIN — OXYCODONE HYDROCHLORIDE 10 MG: 10 TABLET ORAL at 04:30

## 2021-08-20 RX ADMIN — ACETAMINOPHEN 975 MG: 325 TABLET, FILM COATED ORAL at 21:57

## 2021-08-20 RX ADMIN — ENOXAPARIN SODIUM 30 MG: 30 INJECTION SUBCUTANEOUS at 21:57

## 2021-08-20 RX ADMIN — ACETAMINOPHEN 975 MG: 325 TABLET, FILM COATED ORAL at 13:34

## 2021-08-20 RX ADMIN — FOLIC ACID 1 MG: 1 TABLET ORAL at 09:28

## 2021-08-20 RX ADMIN — GABAPENTIN 300 MG: 300 CAPSULE ORAL at 21:57

## 2021-08-20 RX ADMIN — LORAZEPAM 0.5 MG: 0.5 TABLET ORAL at 19:09

## 2021-08-20 RX ADMIN — METHOCARBAMOL 750 MG: 500 TABLET, FILM COATED ORAL at 05:10

## 2021-08-20 RX ADMIN — LORAZEPAM 0.5 MG: 0.5 TABLET ORAL at 11:48

## 2021-08-20 RX ADMIN — LIDOCAINE 1 PATCH: 50 PATCH TOPICAL at 09:28

## 2021-08-20 NOTE — PLAN OF CARE
Administer analgesics based on type and severity of pain and evaluate response  - Implement non-pharmacological measures as appropriate and evaluate response  - Consider cultural and social influences on pain and pain management  - Notify physician/advanced practitioner if interventions unsuccessful or patient reports new pain  Outcome: Progressing     Problem: Knowledge Deficit  Goal: Patient/family/caregiver demonstrates understanding of disease process, treatment plan, medications, and discharge instructions  Description: Complete learning assessment and assess knowledge base    Interventions:  - Provide teaching at level of understanding  - Provide teaching via preferred learning methods  Outcome: Progressing     Problem: SKIN/TISSUE INTEGRITY - ADULT  Goal: Incision(s), wounds(s) or drain site(s) healing without S/S of infection  Description: INTERVENTIONS  - Assess and document dressing, incision, wound bed, drain sites and surrounding tissue  - Provide patient and family education  - Perform skin care/dressing changes every   Outcome: Progressing     Problem: MUSCULOSKELETAL - ADULT  Goal: Maintain or return mobility to safest level of function  Description: INTERVENTIONS:  - Assess patient's ability to carry out ADLs; assess patient's baseline for ADL function and identify physical deficits which impact ability to perform ADLs (bathing, care of mouth/teeth, toileting, grooming, dressing, etc )  - Assess/evaluate cause of self-care deficits   - Assess range of motion  - Assess patient's mobility  - Assess patient's need for assistive devices and provide as appropriate  - Encourage maximum independence but intervene and supervise when necessary  - Involve family in performance of ADLs  - Assess for home care needs following discharge   - Consider OT consult to assist with ADL evaluation and planning for discharge  - Provide patient education as appropriate  Outcome: Progressing     Problem: MOBILITY - ADULT  Goal: Maintain or return to baseline ADL function  Description: INTERVENTIONS:  -  Assess patient's ability to carry out ADLs; assess patient's baseline for ADL function and identify physical deficits which impact ability to perform ADLs (bathing, care of mouth/teeth, toileting, grooming, dressing, etc )  - Assess/evaluate cause of self-care deficits   - Assess range of motion  - Assess patient's mobility; develop plan if impaired  - Assess patient's need for assistive devices and provide as appropriate  - Encourage maximum independence but intervene and supervise when necessary  - Involve family in performance of ADLs  - Assess for home care needs following discharge   - Consider OT consult to assist with ADL evaluation and planning for discharge  - Provide patient education as appropriate  Outcome: Progressing  Goal: Maintains/Returns to pre admission functional level  Description: INTERVENTIONS:  - Perform BMAT or MOVE assessment daily    - Set and communicate daily mobility goal to care team and patient/family/caregiver  - Collaborate with rehabilitation services on mobility goals if consulted  - Perform Range of Motion  times a day  - Reposition patient every  hours  - Dangle patient  times a day  - Stand patient  times a day  - Ambulate patient  times a day  - Out of bed to chair  times a day   - Out of bed for meals times a day  - Out of bed for toileting  - Record patient progress and toleration of activity level   Outcome: Progressing     Problem: Nutrition/Hydration-ADULT  Goal: Nutrient/Hydration intake appropriate for improving, restoring or maintaining nutritional needs  Description: Monitor and assess patient's nutrition/hydration status for malnutrition  Collaborate with interdisciplinary team and initiate plan and interventions as ordered  Monitor patient's weight and dietary intake as ordered or per policy  Utilize nutrition screening tool and intervene as necessary   Determine patient's food preferences and provide high-protein, high-caloric foods as appropriate       INTERVENTIONS:  - Monitor oral intake, urinary output, labs, and treatment plans  - Assess nutrition and hydration status and recommend course of action  - Evaluate amount of meals eaten  - Assist patient with eating if necessary   - Allow adequate time for meals  - Recommend/ encourage appropriate diets, oral nutritional supplements, and vitamin/mineral supplements  - Order, calculate, and assess calorie counts as needed  - Recommend, monitor, and adjust tube feedings and TPN/PPN based on assessed needs  - Assess need for intravenous fluids  - Provide specific nutrition/hydration education as appropriate  - Include patient/family/caregiver in decisions related to nutrition  Outcome: Progressing
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
## 2021-08-20 NOTE — ASSESSMENT & PLAN NOTE
- Right periorbital facial contusion, present on presentation     - Continue analgesia as needed  - May use ice for swelling and pain    -r esolving

## 2021-08-20 NOTE — PLAN OF CARE
Problem: PHYSICAL THERAPY ADULT  Goal: Performs mobility at highest level of function for planned discharge setting  See evaluation for individualized goals  Description: Treatment/Interventions: LE strengthening/ROM, Functional transfer training, Elevations, Therapeutic exercise, Endurance training, Patient/family training, Equipment eval/education, Bed mobility, Gait training, Spoke to nursing, OT  Equipment Recommended:  (continue to assess )       See flowsheet documentation for full assessment, interventions and recommendations  Outcome: Progressing  Note: Prognosis: Good  Problem List: Decreased strength, Decreased endurance, Impaired balance, Decreased mobility, Decreased coordination, Impaired judgement, Decreased cognition, Pain, Orthopedic restrictions, Decreased safety awareness, Decreased range of motion  Assessment: Pt continues to make steady progress in recent sessions  Ambulated community distances with use of SPC & no LOB noted  Progressed to stair trial, but pt required increased assist due to fear & anxiety, resulting in increasd postural guarding & R shoulder pain  Pt also noncompliant with RUE weight bearing while on steps as a result, which also likely contributed to pain  Will benefit from further practice with mobiilty tasks, most notably on steps to ensure safe transitino to home environment  Discussed mobiilty outside of PT sessions & encouraged pt to ambulate as tolerated in room & hallway  RN informed & in agreement as well  POC and discharge plan remain appropriate at this time pending pt progress  Barriers to Discharge: Inaccessible home environment, Decreased caregiver support        PT Discharge Recommendation: Post acute rehabilitation services (pending progress, improved stair trial)     PT - OK to Discharge: Yes    See flowsheet documentation for full assessment

## 2021-08-20 NOTE — ASSESSMENT & PLAN NOTE
- Patient reported that she took medications for anxiety before she lost insurance, does not remember the name of the medication   - Continue Lexapro; appreciate Behavioral Health recommendations  - Behavioral Health evaluation and recommendations appreciated    Recommend alcohol rehabilitation following medical and physical rehabilitation   - Recommend outpatient follow-up with primary care provider, does not appear as anxious today

## 2021-08-20 NOTE — PHYSICAL THERAPY NOTE
Physical Therapy Progress Note     08/20/21 1130   PT Last Visit   PT Visit Date 08/20/21   Note Type   Note Type Treatment   Pain Assessment   Pain Assessment Tool FLACC   Pain Rating: FLACC (Rest) - Face 1   Pain Rating: FLACC (Rest) - Legs 0   Pain Rating: FLACC (Rest) - Activity 0   Pain Rating: FLACC (Rest) - Cry 1   Pain Rating: FLACC (Rest) - Consolability 0   Score: FLACC (Rest) 2   Pain Rating: FLACC (Activity) - Face 1   Pain Rating: FLACC (Activity) - Legs 1   Pain Rating: FLACC (Activity) - Activity 1   Pain Rating: FLACC (Activity) - Cry 1   Pain Rating: FLACC (Activity) - Consolability 0   Score: FLACC (Activity) 4   Restrictions/Precautions   RUE Weight Bearing Per Order NWB   Braces or Orthoses Sling   Other Precautions WBS;Pain; Fall Risk   Subjective   Subjective pt encountered seated EOB, pleasant and agreeable to treatment  Reports improved pain control & offers no new complaitns  Became tearful on occasion, but would not explain why when asked  While attempting steps, pt became fearful, then reported inreased R shoulder pain, which persisted until she was able to reach chair to sit & recover  Pt appeared comfortable at conclusion of session once in recliner  Requested anxiety meds after session  RN informed   Transfers   Sit to Stand 5  Supervision   Additional items Assist x 1   Stand to Sit 5  Supervision   Additional items Assist x 1   Ambulation/Elevation   Gait pattern Excessively slow; Short stride;Decreased foot clearance; Improper Weight shift   Gait Assistance 5  Supervision   Additional items Assist x 1   Assistive Device [facility]   Distance 400' total   Balance   Static Sitting Fair +   Static Standing Fair -   Ambulatory Fair -   Endurance Deficit   Endurance Deficit Yes   Endurance Deficit Description pain, anxiety   Activity Tolerance   Activity Tolerance Patient tolerated treatment well;Patient limited by fatigue;Patient limited by pain   Nurse Nataly Ramesh, LESTER   Assessment Prognosis Good   Problem List Decreased strength;Decreased endurance; Impaired balance;Decreased mobility; Decreased coordination; Impaired judgement;Decreased cognition;Pain;Orthopedic restrictions;Decreased safety awareness;Decreased range of motion   Assessment Pt continues to make steady progress in recent sessions  Ambulated community distances with use of SPC & no LOB noted  Progressed to stair trial, but pt required increased assist due to fear & anxiety, resulting in increasd postural guarding & R shoulder pain  Pt also noncompliant with RUE weight bearing while on steps as a result, which also likely contributed to pain  Will benefit from further practice with mobiilty tasks, most notably on steps to ensure safe transitino to home environment  Discussed mobiilty outside of PT sessions & encouraged pt to ambulate as tolerated in room & hallway  RN informed & in agreement as well  POC and discharge plan remain appropriate at this time pending pt progress  Goals   Patient Goals to be safe   STG Expiration Date 08/20/21   PT Treatment Day 3   Plan   Treatment/Interventions Functional transfer training;LE strengthening/ROM; Elevations; Therapeutic exercise; Endurance training;Equipment eval/education;Patient/family training;Bed mobility;Gait training   Progress Progressing toward goals   PT Frequency Other (Comment)  (3-6x/week)   Recommendation   PT Discharge Recommendation Post acute rehabilitation services  (pending progress, improved stair trial)   Equipment Recommended Cane   AM-PAC Basic Mobility Inpatient   Turning in Bed Without Bedrails 4   Lying on Back to Sitting on Edge of Flat Bed 4   Moving Bed to Chair 4   Standing Up From Chair 4   Walk in Room 4   Climb 3-5 Stairs 3   Basic Mobility Inpatient Raw Score 23   Basic Mobility Standardized Score 50 88   The patient's AM-PAC Basic Mobility Inpatient Short Form Raw Score is 23, Standardized Score is 50  88   A standardized score greater than 42 9 suggests the patient may benefit from discharge to home  Please also refer to the recommendation of the Physical Therapist for safe discharge planning            Kassie Dean PTA

## 2021-08-21 PROCEDURE — 99232 SBSQ HOSP IP/OBS MODERATE 35: CPT | Performed by: SURGERY

## 2021-08-21 RX ADMIN — GABAPENTIN 300 MG: 300 CAPSULE ORAL at 21:50

## 2021-08-21 RX ADMIN — OXYCODONE HYDROCHLORIDE 10 MG: 10 TABLET ORAL at 12:30

## 2021-08-21 RX ADMIN — OXYCODONE HYDROCHLORIDE 10 MG: 10 TABLET ORAL at 16:46

## 2021-08-21 RX ADMIN — OXYCODONE HYDROCHLORIDE 10 MG: 10 TABLET ORAL at 23:57

## 2021-08-21 RX ADMIN — METHOCARBAMOL 750 MG: 500 TABLET, FILM COATED ORAL at 17:51

## 2021-08-21 RX ADMIN — OXYCODONE HYDROCHLORIDE 10 MG: 10 TABLET ORAL at 08:22

## 2021-08-21 RX ADMIN — LIDOCAINE 1 PATCH: 50 PATCH TOPICAL at 08:20

## 2021-08-21 RX ADMIN — ACETAMINOPHEN 975 MG: 325 TABLET, FILM COATED ORAL at 21:49

## 2021-08-21 RX ADMIN — ESCITALOPRAM OXALATE 10 MG: 10 TABLET ORAL at 08:17

## 2021-08-21 RX ADMIN — ENOXAPARIN SODIUM 30 MG: 30 INJECTION SUBCUTANEOUS at 08:18

## 2021-08-21 RX ADMIN — LORAZEPAM 0.5 MG: 0.5 TABLET ORAL at 11:17

## 2021-08-21 RX ADMIN — SENNOSIDES 17.2 MG: 8.6 TABLET ORAL at 08:17

## 2021-08-21 RX ADMIN — LORAZEPAM 0.5 MG: 0.5 TABLET ORAL at 23:36

## 2021-08-21 RX ADMIN — METHOCARBAMOL 750 MG: 500 TABLET, FILM COATED ORAL at 05:44

## 2021-08-21 RX ADMIN — FOLIC ACID 1 MG: 1 TABLET ORAL at 08:18

## 2021-08-21 RX ADMIN — ENOXAPARIN SODIUM 30 MG: 30 INJECTION SUBCUTANEOUS at 21:49

## 2021-08-21 RX ADMIN — LORAZEPAM 0.5 MG: 0.5 TABLET ORAL at 05:48

## 2021-08-21 RX ADMIN — THIAMINE HCL TAB 100 MG 100 MG: 100 TAB at 08:17

## 2021-08-21 RX ADMIN — ACETAMINOPHEN 975 MG: 325 TABLET, FILM COATED ORAL at 05:45

## 2021-08-21 RX ADMIN — LISINOPRIL 10 MG: 10 TABLET ORAL at 08:18

## 2021-08-21 RX ADMIN — METHOCARBAMOL 750 MG: 500 TABLET, FILM COATED ORAL at 11:17

## 2021-08-21 RX ADMIN — LORAZEPAM 0.5 MG: 0.5 TABLET ORAL at 17:51

## 2021-08-21 RX ADMIN — OXYCODONE HYDROCHLORIDE 10 MG: 10 TABLET ORAL at 03:41

## 2021-08-21 RX ADMIN — ACETAMINOPHEN 975 MG: 325 TABLET, FILM COATED ORAL at 13:06

## 2021-08-21 NOTE — ASSESSMENT & PLAN NOTE
- Right periorbital facial contusion, present on presentation     - Continue analgesia as needed      - May use ice for swelling and pain   -resolving
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
## 2021-08-21 NOTE — PLAN OF CARE
Problem: Potential for Falls  Goal: Patient will remain free of falls  Description: INTERVENTIONS:  - Educate patient/family on patient safety including physical limitations  - Instruct patient to call for assistance with activity   - Consult OT/PT to assist with strengthening/mobility   - Keep Call bell within reach  - Keep bed low and locked with side rails adjusted as appropriate  - Keep care items and personal belongings within reach  - Initiate and maintain comfort rounds  - Make Fall Risk Sign visible to staff  - Offer Toileting every 2 Hours, in advance of need  - Initiate/Maintain alarm  - Obtain necessary fall risk management equipment:   - Apply yellow socks and bracelet for high fall risk patients  - Consider moving patient to room near nurses station  Outcome: Progressing     Problem: DISCHARGE PLANNING - CARE MANAGEMENT  Goal: Discharge to post-acute care or home with appropriate resources  Description: INTERVENTIONS:  - Conduct assessment to determine patient/family and health care team treatment goals, and need for post-acute services based on payer coverage, community resources, and patient preferences, and barriers to discharge  - Address psychosocial, clinical, and financial barriers to discharge as identified in assessment in conjunction with the patient/family and health care team  - Arrange appropriate level of post-acute services according to patients   needs and preference and payer coverage in collaboration with the physician and health care team  - Communicate with and update the patient/family, physician, and health care team regarding progress on the discharge plan  - Arrange appropriate transportation to post-acute venues  Outcome: Progressing     Problem: PAIN - ADULT  Goal: Verbalizes/displays adequate comfort level or baseline comfort level  Description: Interventions:  - Encourage patient to monitor pain and request assistance  - Assess pain using appropriate pain scale  - Administer analgesics based on type and severity of pain and evaluate response  - Implement non-pharmacological measures as appropriate and evaluate response  - Consider cultural and social influences on pain and pain management  - Notify physician/advanced practitioner if interventions unsuccessful or patient reports new pain  Outcome: Progressing     Problem: Knowledge Deficit  Goal: Patient/family/caregiver demonstrates understanding of disease process, treatment plan, medications, and discharge instructions  Description: Complete learning assessment and assess knowledge base    Interventions:  - Provide teaching at level of understanding  - Provide teaching via preferred learning methods  Outcome: Progressing     Problem: SKIN/TISSUE INTEGRITY - ADULT  Goal: Incision(s), wounds(s) or drain site(s) healing without S/S of infection  Description: INTERVENTIONS  - Assess and document dressing, incision, wound bed, drain sites and surrounding tissue  - Provide patient and family education  - Perform skin care/dressing changes every shift  Outcome: Progressing     Problem: MUSCULOSKELETAL - ADULT  Goal: Maintain or return mobility to safest level of function  Description: INTERVENTIONS:  - Assess patient's ability to carry out ADLs; assess patient's baseline for ADL function and identify physical deficits which impact ability to perform ADLs (bathing, care of mouth/teeth, toileting, grooming, dressing, etc )  - Assess/evaluate cause of self-care deficits   - Assess range of motion  - Assess patient's mobility  - Assess patient's need for assistive devices and provide as appropriate  - Encourage maximum independence but intervene and supervise when necessary  - Involve family in performance of ADLs  - Assess for home care needs following discharge   - Consider OT consult to assist with ADL evaluation and planning for discharge  - Provide patient education as appropriate  Outcome: Progressing     Problem: MOBILITY - ADULT  Goal: Maintain or return to baseline ADL function  Description: INTERVENTIONS:  -  Assess patient's ability to carry out ADLs; assess patient's baseline for ADL function and identify physical deficits which impact ability to perform ADLs (bathing, care of mouth/teeth, toileting, grooming, dressing, etc )  - Assess/evaluate cause of self-care deficits   - Assess range of motion  - Assess patient's mobility; develop plan if impaired  - Assess patient's need for assistive devices and provide as appropriate  - Encourage maximum independence but intervene and supervise when necessary  - Involve family in performance of ADLs  - Assess for home care needs following discharge   - Consider OT consult to assist with ADL evaluation and planning for discharge  - Provide patient education as appropriate  Outcome: Progressing  Goal: Maintains/Returns to pre admission functional level  Description: INTERVENTIONS:  - Perform BMAT or MOVE assessment daily    - Set and communicate daily mobility goal to care team and patient/family/caregiver  - Collaborate with rehabilitation services on mobility goals if consulted  - Perform Range of Motion 3 times a day  - Reposition patient every 2 hours  - Ambulate patient 2 times a day  - Out of bed to chair 3 times a day   - Out of bed for meals 3 times a day  - Out of bed for toileting  - Record patient progress and toleration of activity level   Outcome: Progressing     Problem: Nutrition/Hydration-ADULT  Goal: Nutrient/Hydration intake appropriate for improving, restoring or maintaining nutritional needs  Description: Monitor and assess patient's nutrition/hydration status for malnutrition  Collaborate with interdisciplinary team and initiate plan and interventions as ordered  Monitor patient's weight and dietary intake as ordered or per policy  Utilize nutrition screening tool and intervene as necessary   Determine patient's food preferences and provide high-protein, high-caloric foods as appropriate       INTERVENTIONS:  - Monitor oral intake, urinary output, labs, and treatment plans  - Assess nutrition and hydration status and recommend course of action  - Evaluate amount of meals eaten  - Assist patient with eating if necessary   - Allow adequate time for meals  - Recommend/ encourage appropriate diets, oral nutritional supplements, and vitamin/mineral supplements  - Order, calculate, and assess calorie counts as needed  - Recommend, monitor, and adjust tube feedings and TPN/PPN based on assessed needs  - Assess need for intravenous fluids  - Provide specific nutrition/hydration education as appropriate  - Include patient/family/caregiver in decisions related to nutrition  Outcome: Progressing

## 2021-08-22 PROCEDURE — 99232 SBSQ HOSP IP/OBS MODERATE 35: CPT | Performed by: SURGERY

## 2021-08-22 RX ADMIN — ESCITALOPRAM OXALATE 10 MG: 10 TABLET ORAL at 09:01

## 2021-08-22 RX ADMIN — LIDOCAINE 1 PATCH: 50 PATCH TOPICAL at 09:01

## 2021-08-22 RX ADMIN — METHOCARBAMOL 750 MG: 500 TABLET, FILM COATED ORAL at 05:09

## 2021-08-22 RX ADMIN — FOLIC ACID 1 MG: 1 TABLET ORAL at 09:01

## 2021-08-22 RX ADMIN — GABAPENTIN 300 MG: 300 CAPSULE ORAL at 21:58

## 2021-08-22 RX ADMIN — ENOXAPARIN SODIUM 30 MG: 30 INJECTION SUBCUTANEOUS at 09:01

## 2021-08-22 RX ADMIN — OXYCODONE HYDROCHLORIDE 10 MG: 10 TABLET ORAL at 17:14

## 2021-08-22 RX ADMIN — METHOCARBAMOL 750 MG: 500 TABLET, FILM COATED ORAL at 17:14

## 2021-08-22 RX ADMIN — ENOXAPARIN SODIUM 30 MG: 30 INJECTION SUBCUTANEOUS at 21:58

## 2021-08-22 RX ADMIN — METHOCARBAMOL 750 MG: 500 TABLET, FILM COATED ORAL at 12:33

## 2021-08-22 RX ADMIN — ACETAMINOPHEN 975 MG: 325 TABLET, FILM COATED ORAL at 21:58

## 2021-08-22 RX ADMIN — LORAZEPAM 0.5 MG: 0.5 TABLET ORAL at 06:45

## 2021-08-22 RX ADMIN — LORAZEPAM 0.5 MG: 0.5 TABLET ORAL at 18:04

## 2021-08-22 RX ADMIN — ACETAMINOPHEN 975 MG: 325 TABLET, FILM COATED ORAL at 13:18

## 2021-08-22 RX ADMIN — OXYCODONE HYDROCHLORIDE 10 MG: 10 TABLET ORAL at 21:59

## 2021-08-22 RX ADMIN — OXYCODONE HYDROCHLORIDE 10 MG: 10 TABLET ORAL at 09:07

## 2021-08-22 RX ADMIN — LISINOPRIL 10 MG: 10 TABLET ORAL at 09:01

## 2021-08-22 RX ADMIN — ACETAMINOPHEN 975 MG: 325 TABLET, FILM COATED ORAL at 05:09

## 2021-08-22 RX ADMIN — OXYCODONE HYDROCHLORIDE 10 MG: 10 TABLET ORAL at 13:18

## 2021-08-22 RX ADMIN — LORAZEPAM 0.5 MG: 0.5 TABLET ORAL at 12:35

## 2021-08-22 RX ADMIN — METHOCARBAMOL 750 MG: 500 TABLET, FILM COATED ORAL at 00:00

## 2021-08-22 RX ADMIN — OXYCODONE HYDROCHLORIDE 10 MG: 10 TABLET ORAL at 05:11

## 2021-08-22 RX ADMIN — METHOCARBAMOL 750 MG: 500 TABLET, FILM COATED ORAL at 23:29

## 2021-08-22 RX ADMIN — THIAMINE HCL TAB 100 MG 100 MG: 100 TAB at 09:00
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
## 2021-08-23 RX ADMIN — OXYCODONE HYDROCHLORIDE 10 MG: 10 TABLET ORAL at 11:49

## 2021-08-23 RX ADMIN — FOLIC ACID 1 MG: 1 TABLET ORAL at 09:08

## 2021-08-23 RX ADMIN — LORAZEPAM 0.5 MG: 0.5 TABLET ORAL at 17:38

## 2021-08-23 RX ADMIN — OXYCODONE HYDROCHLORIDE 10 MG: 10 TABLET ORAL at 16:01

## 2021-08-23 RX ADMIN — ENOXAPARIN SODIUM 30 MG: 30 INJECTION SUBCUTANEOUS at 20:28

## 2021-08-23 RX ADMIN — LISINOPRIL 10 MG: 10 TABLET ORAL at 09:07

## 2021-08-23 RX ADMIN — ESCITALOPRAM OXALATE 10 MG: 10 TABLET ORAL at 09:07

## 2021-08-23 RX ADMIN — LORAZEPAM 0.5 MG: 0.5 TABLET ORAL at 00:02

## 2021-08-23 RX ADMIN — OXYCODONE HYDROCHLORIDE 10 MG: 10 TABLET ORAL at 02:37

## 2021-08-23 RX ADMIN — METHOCARBAMOL 750 MG: 500 TABLET, FILM COATED ORAL at 11:49

## 2021-08-23 RX ADMIN — METHOCARBAMOL 750 MG: 500 TABLET, FILM COATED ORAL at 23:51

## 2021-08-23 RX ADMIN — METHOCARBAMOL 750 MG: 500 TABLET, FILM COATED ORAL at 17:38

## 2021-08-23 RX ADMIN — LORAZEPAM 0.5 MG: 0.5 TABLET ORAL at 23:51

## 2021-08-23 RX ADMIN — THIAMINE HCL TAB 100 MG 100 MG: 100 TAB at 09:08

## 2021-08-23 RX ADMIN — GABAPENTIN 300 MG: 300 CAPSULE ORAL at 23:51

## 2021-08-23 RX ADMIN — OXYCODONE HYDROCHLORIDE 10 MG: 10 TABLET ORAL at 20:07

## 2021-08-23 RX ADMIN — SENNOSIDES 17.2 MG: 8.6 TABLET ORAL at 09:07

## 2021-08-23 RX ADMIN — LORAZEPAM 0.5 MG: 0.5 TABLET ORAL at 05:41

## 2021-08-23 RX ADMIN — OXYCODONE HYDROCHLORIDE 10 MG: 10 TABLET ORAL at 07:40

## 2021-08-23 RX ADMIN — LORAZEPAM 0.5 MG: 0.5 TABLET ORAL at 11:52

## 2021-08-23 RX ADMIN — ACETAMINOPHEN 975 MG: 325 TABLET, FILM COATED ORAL at 13:37

## 2021-08-23 RX ADMIN — LIDOCAINE 1 PATCH: 50 PATCH TOPICAL at 09:08

## 2021-08-23 RX ADMIN — METHOCARBAMOL 750 MG: 500 TABLET, FILM COATED ORAL at 05:41

## 2021-08-23 RX ADMIN — ACETAMINOPHEN 975 MG: 325 TABLET, FILM COATED ORAL at 05:41

## 2021-08-23 RX ADMIN — ENOXAPARIN SODIUM 30 MG: 30 INJECTION SUBCUTANEOUS at 09:07

## 2021-08-23 RX ADMIN — ACETAMINOPHEN 975 MG: 325 TABLET, FILM COATED ORAL at 23:51

## 2021-08-23 NOTE — PLAN OF CARE
Problem: DISCHARGE PLANNING - CARE MANAGEMENT  Goal: Discharge to post-acute care or home with appropriate resources  Description: INTERVENTIONS:  - Conduct assessment to determine patient/family and health care team treatment goals, and need for post-acute services based on payer coverage, community resources, and patient preferences, and barriers to discharge  - Address psychosocial, clinical, and financial barriers to discharge as identified in assessment in conjunction with the patient/family and health care team  - Arrange appropriate level of post-acute services according to patients   needs and preference and payer coverage in collaboration with the physician and health care team  - Communicate with and update the patient/family, physician, and health care team regarding progress on the discharge plan  - Arrange appropriate transportation to post-acute venues  Outcome: Progressing     Problem: PAIN - ADULT  Goal: Verbalizes/displays adequate comfort level or baseline comfort level  Description: Interventions:  - Encourage patient to monitor pain and request assistance  - Assess pain using appropriate pain scale  - Administer analgesics based on type and severity of pain and evaluate response  - Implement non-pharmacological measures as appropriate and evaluate response  - Consider cultural and social influences on pain and pain management  - Notify physician/advanced practitioner if interventions unsuccessful or patient reports new pain  Outcome: Progressing     Problem: Knowledge Deficit  Goal: Patient/family/caregiver demonstrates understanding of disease process, treatment plan, medications, and discharge instructions  Description: Complete learning assessment and assess knowledge base    Interventions:  - Provide teaching at level of understanding  - Provide teaching via preferred learning methods  Outcome: Progressing
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
## 2021-08-24 PROCEDURE — 97164 PT RE-EVAL EST PLAN CARE: CPT

## 2021-08-24 RX ADMIN — LORAZEPAM 0.5 MG: 0.5 TABLET ORAL at 05:47

## 2021-08-24 RX ADMIN — THIAMINE HCL TAB 100 MG 100 MG: 100 TAB at 08:02

## 2021-08-24 RX ADMIN — METHOCARBAMOL 750 MG: 500 TABLET, FILM COATED ORAL at 11:00

## 2021-08-24 RX ADMIN — LISINOPRIL 10 MG: 10 TABLET ORAL at 08:02

## 2021-08-24 RX ADMIN — METHOCARBAMOL 750 MG: 500 TABLET, FILM COATED ORAL at 17:59

## 2021-08-24 RX ADMIN — ENOXAPARIN SODIUM 30 MG: 30 INJECTION SUBCUTANEOUS at 21:17

## 2021-08-24 RX ADMIN — ACETAMINOPHEN 975 MG: 325 TABLET, FILM COATED ORAL at 13:01

## 2021-08-24 RX ADMIN — OXYCODONE HYDROCHLORIDE 10 MG: 10 TABLET ORAL at 00:52

## 2021-08-24 RX ADMIN — LIDOCAINE 1 PATCH: 50 PATCH TOPICAL at 08:03

## 2021-08-24 RX ADMIN — OXYCODONE HYDROCHLORIDE 10 MG: 10 TABLET ORAL at 11:00

## 2021-08-24 RX ADMIN — ENOXAPARIN SODIUM 30 MG: 30 INJECTION SUBCUTANEOUS at 08:02

## 2021-08-24 RX ADMIN — OXYCODONE HYDROCHLORIDE 10 MG: 10 TABLET ORAL at 20:12

## 2021-08-24 RX ADMIN — OXYCODONE HYDROCHLORIDE 10 MG: 10 TABLET ORAL at 15:16

## 2021-08-24 RX ADMIN — ACETAMINOPHEN 975 MG: 325 TABLET, FILM COATED ORAL at 21:17

## 2021-08-24 RX ADMIN — FOLIC ACID 1 MG: 1 TABLET ORAL at 08:02

## 2021-08-24 RX ADMIN — LORAZEPAM 0.5 MG: 0.5 TABLET ORAL at 11:55

## 2021-08-24 RX ADMIN — ESCITALOPRAM OXALATE 10 MG: 10 TABLET ORAL at 08:02

## 2021-08-24 RX ADMIN — LORAZEPAM 0.5 MG: 0.5 TABLET ORAL at 17:59

## 2021-08-24 RX ADMIN — OXYCODONE HYDROCHLORIDE 10 MG: 10 TABLET ORAL at 05:47

## 2021-08-24 RX ADMIN — ACETAMINOPHEN 975 MG: 325 TABLET, FILM COATED ORAL at 05:47

## 2021-08-24 RX ADMIN — GABAPENTIN 300 MG: 300 CAPSULE ORAL at 21:17

## 2021-08-24 RX ADMIN — METHOCARBAMOL 750 MG: 500 TABLET, FILM COATED ORAL at 05:47
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
## 2021-08-24 NOTE — DISCHARGE INSTRUCTIONS
Discharge Instructions - Orthopedics  Ninoska Spear 46 y o  female MRN: [id number]  Unit/Bed#: [facility] 622-01    Weight Bearing Status:                                           Non weight bearing to right upper extremity in sling    Pain:  Continue analgesics as directed    Appt Instructions: If you do not have your appointment, please call the clinic at [phone] t  Otherwise followup as scheduled     Contact the office sooner if you experience any increased numbness/tingling in the extremities        Miscellaneous:  none

## 2021-08-24 NOTE — CASE MANAGEMENT
Mauro spoke to pt's sister Herb Garcia [phone]  She will bring the pt to her home, but she is unable to get the patient until Thursday as she is in [place]  Herb Garcia will assist with all discharge planning  Pt pleased with this plan  Mauro also talked to Rayray Vides of [address] who is in agreement with d/c plan and requesting pt's clinical information   She will email al signed release form and then CM will fax papert
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
## 2021-08-25 RX ADMIN — OXYCODONE HYDROCHLORIDE 10 MG: 10 TABLET ORAL at 23:18

## 2021-08-25 RX ADMIN — LIDOCAINE 1 PATCH: 50 PATCH TOPICAL at 09:19

## 2021-08-25 RX ADMIN — OXYCODONE HYDROCHLORIDE 10 MG: 10 TABLET ORAL at 13:22

## 2021-08-25 RX ADMIN — SENNOSIDES 17.2 MG: 8.6 TABLET ORAL at 09:20

## 2021-08-25 RX ADMIN — LORAZEPAM 0.5 MG: 0.5 TABLET ORAL at 12:10

## 2021-08-25 RX ADMIN — ACETAMINOPHEN 975 MG: 325 TABLET, FILM COATED ORAL at 22:02

## 2021-08-25 RX ADMIN — OXYCODONE HYDROCHLORIDE 10 MG: 10 TABLET ORAL at 05:04

## 2021-08-25 RX ADMIN — ESCITALOPRAM OXALATE 10 MG: 10 TABLET ORAL at 09:21

## 2021-08-25 RX ADMIN — ACETAMINOPHEN 975 MG: 325 TABLET, FILM COATED ORAL at 05:02

## 2021-08-25 RX ADMIN — LORAZEPAM 0.5 MG: 0.5 TABLET ORAL at 00:05

## 2021-08-25 RX ADMIN — FOLIC ACID 1 MG: 1 TABLET ORAL at 09:21

## 2021-08-25 RX ADMIN — ACETAMINOPHEN 975 MG: 325 TABLET, FILM COATED ORAL at 13:20

## 2021-08-25 RX ADMIN — METHOCARBAMOL 750 MG: 500 TABLET, FILM COATED ORAL at 17:53

## 2021-08-25 RX ADMIN — OXYCODONE HYDROCHLORIDE 10 MG: 10 TABLET ORAL at 00:05

## 2021-08-25 RX ADMIN — ENOXAPARIN SODIUM 30 MG: 30 INJECTION SUBCUTANEOUS at 22:02

## 2021-08-25 RX ADMIN — THIAMINE HCL TAB 100 MG 100 MG: 100 TAB at 09:21

## 2021-08-25 RX ADMIN — METHOCARBAMOL 750 MG: 500 TABLET, FILM COATED ORAL at 23:17

## 2021-08-25 RX ADMIN — OXYCODONE HYDROCHLORIDE 10 MG: 10 TABLET ORAL at 19:18

## 2021-08-25 RX ADMIN — GABAPENTIN 300 MG: 300 CAPSULE ORAL at 22:02

## 2021-08-25 RX ADMIN — ENOXAPARIN SODIUM 30 MG: 30 INJECTION SUBCUTANEOUS at 09:20

## 2021-08-25 RX ADMIN — METHOCARBAMOL 750 MG: 500 TABLET, FILM COATED ORAL at 11:57

## 2021-08-25 RX ADMIN — METHOCARBAMOL 750 MG: 500 TABLET, FILM COATED ORAL at 05:02

## 2021-08-25 RX ADMIN — OXYCODONE HYDROCHLORIDE 10 MG: 10 TABLET ORAL at 09:20

## 2021-08-25 RX ADMIN — METHOCARBAMOL 750 MG: 500 TABLET, FILM COATED ORAL at 00:05

## 2021-08-25 RX ADMIN — LORAZEPAM 0.5 MG: 0.5 TABLET ORAL at 18:18

## 2021-08-25 RX ADMIN — LORAZEPAM 0.5 MG: 0.5 TABLET ORAL at 06:09

## 2021-08-25 NOTE — PLAN OF CARE
Problem: Potential for Falls  Goal: Patient will remain free of falls  Description: INTERVENTIONS:  - Educate patient/family on patient safety including physical limitations  - Instruct patient to call for assistance with activity   - Consult OT/PT to assist with strengthening/mobility   - Keep Call bell within reach  - Keep bed low and locked with side rails adjusted as appropriate  - Keep care items and personal belongings within reach  - Initiate and maintain comfort rounds  - Make Fall Risk Sign visible to staff  - Offer Toileting every 2 Hours, in advance of need  - Initiate/Maintain alarm  - Obtain necessary fall risk management equipment  - Apply yellow socks and bracelet for high fall risk patients  - Consider moving patient to room near nurses station  Outcome: Progressing     Problem: DISCHARGE PLANNING - CARE MANAGEMENT  Goal: Discharge to post-acute care or home with appropriate resources  Description: INTERVENTIONS:  - Conduct assessment to determine patient/family and health care team treatment goals, and need for post-acute services based on payer coverage, community resources, and patient preferences, and barriers to discharge  - Address psychosocial, clinical, and financial barriers to discharge as identified in assessment in conjunction with the patient/family and health care team  - Arrange appropriate level of post-acute services according to patients   needs and preference and payer coverage in collaboration with the physician and health care team  - Communicate with and update the patient/family, physician, and health care team regarding progress on the discharge plan  - Arrange appropriate transportation to post-acute venues  Outcome: Progressing     Problem: PAIN - ADULT  Goal: Verbalizes/displays adequate comfort level or baseline comfort level  Description: Interventions:  - Encourage patient to monitor pain and request assistance  - Assess pain using appropriate pain scale  - Administer analgesics based on type and severity of pain and evaluate response  - Implement non-pharmacological measures as appropriate and evaluate response  - Consider cultural and social influences on pain and pain management  - Notify physician/advanced practitioner if interventions unsuccessful or patient reports new pain  Outcome: Progressing     Problem: Knowledge Deficit  Goal: Patient/family/caregiver demonstrates understanding of disease process, treatment plan, medications, and discharge instructions  Description: Complete learning assessment and assess knowledge base    Interventions:  - Provide teaching at level of understanding  - Provide teaching via preferred learning methods  Outcome: Progressing     Problem: SKIN/TISSUE INTEGRITY - ADULT  Goal: Incision(s), wounds(s) or drain site(s) healing without S/S of infection  Description: INTERVENTIONS  - Assess and document dressing, incision, wound bed, drain sites and surrounding tissue  - Provide patient and family education    Outcome: Progressing     Problem: MUSCULOSKELETAL - ADULT  Goal: Maintain or return mobility to safest level of function  Description: INTERVENTIONS:  - Assess patient's ability to carry out ADLs; assess patient's baseline for ADL function and identify physical deficits which impact ability to perform ADLs (bathing, care of mouth/teeth, toileting, grooming, dressing, etc )  - Assess/evaluate cause of self-care deficits   - Assess range of motion  - Assess patient's mobility  - Assess patient's need for assistive devices and provide as appropriate  - Encourage maximum independence but intervene and supervise when necessary  - Involve family in performance of ADLs  - Assess for home care needs following discharge   - Consider OT consult to assist with ADL evaluation and planning for discharge  - Provide patient education as appropriate  Outcome: Progressing     Problem: MOBILITY - ADULT  Goal: Maintain or return to baseline ADL function  Description: INTERVENTIONS:  -  Assess patient's ability to carry out ADLs; assess patient's baseline for ADL function and identify physical deficits which impact ability to perform ADLs (bathing, care of mouth/teeth, toileting, grooming, dressing, etc )  - Assess/evaluate cause of self-care deficits   - Assess range of motion  - Assess patient's mobility; develop plan if impaired  - Assess patient's need for assistive devices and provide as appropriate  - Encourage maximum independence but intervene and supervise when necessary  - Involve family in performance of ADLs  - Assess for home care needs following discharge   - Consider OT consult to assist with ADL evaluation and planning for discharge  - Provide patient education as appropriate  Outcome: Progressing  Goal: Maintains/Returns to pre admission functional level  Description: INTERVENTIONS:  - Perform BMAT or MOVE assessment daily    - Set and communicate daily mobility goal to care team and patient/family/caregiver  - Collaborate with rehabilitation services on mobility goals if consulted  - Out of bed for toileting  - Record patient progress and toleration of activity level   Outcome: Progressing     Problem: Nutrition/Hydration-ADULT  Goal: Nutrient/Hydration intake appropriate for improving, restoring or maintaining nutritional needs  Description: Monitor and assess patient's nutrition/hydration status for malnutrition  Collaborate with interdisciplinary team and initiate plan and interventions as ordered  Monitor patient's weight and dietary intake as ordered or per policy  Utilize nutrition screening tool and intervene as necessary  Determine patient's food preferences and provide high-protein, high-caloric foods as appropriate       INTERVENTIONS:  - Monitor oral intake, urinary output, labs, and treatment plans  - Assess nutrition and hydration status and recommend course of action  - Evaluate amount of meals eaten  - Assist patient with eating if necessary   - Allow adequate time for meals  - Recommend/ encourage appropriate diets, oral nutritional supplements, and vitamin/mineral supplements  - Order, calculate, and assess calorie counts as needed  - Recommend, monitor, and adjust tube feedings and TPN/PPN based on assessed needs  - Assess need for intravenous fluids  - Provide specific nutrition/hydration education as appropriate  - Include patient/family/caregiver in decisions related to nutrition  Outcome: Progressing
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
## 2021-08-25 NOTE — OCCUPATIONAL THERAPY NOTE
Occupational Therapy         Patient Name: Kwasi Meneses  VDFAH'R Date: 2021    OT goals  21- pt currently functioning at SBA/MOD I level - has met rehab goals and no longer requires STR - spoke with CM who reports plan is for pt to d/c home with family tomorrow (sister is coming to take pt home) - no need to complete OT re-eval at this time - anticipate d/c home with family support  - d/c from [place]

## 2021-08-25 NOTE — CASE MANAGEMENT
CM called pt's sister Keyur Tee [phone] to review pt's d/c plan for tomorrow, as the pt's sister Gurvinder Chris is supposed to transport her home

## 2021-08-26 VITALS
TEMPERATURE: 98 F | HEIGHT: 65 IN | HEART RATE: 80 BPM | SYSTOLIC BLOOD PRESSURE: 145 MMHG | RESPIRATION RATE: 16 BRPM | BODY MASS INDEX: 30.41 KG/M2 | WEIGHT: 182.5 LBS | DIASTOLIC BLOOD PRESSURE: 88 MMHG | OXYGEN SATURATION: 97 %

## 2021-08-26 RX ORDER — SENNA AND DOCUSATE SODIUM 50; 8.6 MG/1; MG/1
1 TABLET, FILM COATED ORAL 2 TIMES DAILY
Qty: 30 TABLET | Refills: 0 | Status: SHIPPED | OUTPATIENT
Start: 2021-08-26

## 2021-08-26 RX ORDER — ESCITALOPRAM OXALATE 10 MG/1
10 TABLET ORAL DAILY
Qty: 30 TABLET | Refills: 0 | Status: SHIPPED | OUTPATIENT
Start: 2021-08-27 | End: 2021-09-26

## 2021-08-26 RX ORDER — GABAPENTIN 300 MG/1
300 CAPSULE ORAL
Qty: 14 CAPSULE | Refills: 0 | Status: SHIPPED | OUTPATIENT
Start: 2021-08-26 | End: 2021-09-09

## 2021-08-26 RX ORDER — METHOCARBAMOL 750 MG/1
750 TABLET, FILM COATED ORAL EVERY 6 HOURS PRN
Qty: 40 TABLET | Refills: 0 | Status: SHIPPED | OUTPATIENT
Start: 2021-08-26

## 2021-08-26 RX ORDER — ACETAMINOPHEN 325 MG/1
975 TABLET ORAL EVERY 8 HOURS PRN
Refills: 0
Start: 2021-08-26

## 2021-08-26 RX ORDER — LIDOCAINE 50 MG/G
1 PATCH TOPICAL DAILY
Qty: 6 PATCH | Refills: 0 | Status: SHIPPED | OUTPATIENT
Start: 2021-08-27

## 2021-08-26 RX ORDER — OXYCODONE HYDROCHLORIDE 10 MG/1
TABLET ORAL
Qty: 21 TABLET | Refills: 0 | Status: SHIPPED | OUTPATIENT
Start: 2021-08-26

## 2021-08-26 RX ORDER — OXYCODONE HYDROCHLORIDE 10 MG/1
TABLET ORAL
Qty: 21 TABLET | Refills: 0 | Status: SHIPPED | OUTPATIENT
Start: 2021-08-26 | End: 2021-08-26

## 2021-08-26 RX ADMIN — FOLIC ACID 1 MG: 1 TABLET ORAL at 08:54

## 2021-08-26 RX ADMIN — LORAZEPAM 0.5 MG: 0.5 TABLET ORAL at 00:22

## 2021-08-26 RX ADMIN — ESCITALOPRAM OXALATE 10 MG: 10 TABLET ORAL at 08:54

## 2021-08-26 RX ADMIN — ENOXAPARIN SODIUM 30 MG: 30 INJECTION SUBCUTANEOUS at 08:54

## 2021-08-26 RX ADMIN — OXYCODONE HYDROCHLORIDE 10 MG: 10 TABLET ORAL at 04:59

## 2021-08-26 RX ADMIN — LISINOPRIL 10 MG: 10 TABLET ORAL at 08:54

## 2021-08-26 RX ADMIN — THIAMINE HCL TAB 100 MG 100 MG: 100 TAB at 08:54

## 2021-08-26 RX ADMIN — LORAZEPAM 0.5 MG: 0.5 TABLET ORAL at 10:56

## 2021-08-26 RX ADMIN — OXYCODONE HYDROCHLORIDE 10 MG: 10 TABLET ORAL at 13:00

## 2021-08-26 RX ADMIN — LIDOCAINE 1 PATCH: 50 PATCH TOPICAL at 08:55

## 2021-08-26 RX ADMIN — ACETAMINOPHEN 975 MG: 325 TABLET, FILM COATED ORAL at 04:59

## 2021-08-26 RX ADMIN — OXYCODONE HYDROCHLORIDE 10 MG: 10 TABLET ORAL at 08:54

## 2021-08-26 RX ADMIN — METHOCARBAMOL 750 MG: 500 TABLET, FILM COATED ORAL at 04:59

## 2021-08-26 NOTE — INCIDENTAL FINDINGS
The following findings require follow up:  Radiographic finding   Finding: Macroscopic fat-containing right ovarian dermoid cyst measuring 5 6 cm   Follow up required: PCP, OB/GYN   Follow up should be done within 1 month(s)    Please notify the following clinician to assist with the follow up:   PCP, OB/GYN
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
## 2021-08-26 NOTE — CASE MANAGEMENT
Cm spoke to Pt's sister's Reanna Keen [phone]  She is unable to transport the pt to her house  Pt will need a lyft  Pt will be taken to:  [facility] [address], [address]    Samir Block will come at 1130  Pt will be picked up at the A entrance   Lyft will call pt's nurse at

## 2021-08-30 ENCOUNTER — TELEPHONE (OUTPATIENT)
Dept: OBGYN CLINIC | Facility: HOSPITAL | Age: 51
End: 2021-08-30

## 2021-08-30 ENCOUNTER — TELEPHONE (OUTPATIENT)
Dept: FAMILY MEDICINE CLINIC | Facility: CLINIC | Age: 51
End: 2021-08-30

## 2021-08-30 ENCOUNTER — TRANSITIONAL CARE MANAGEMENT (OUTPATIENT)
Dept: FAMILY MEDICINE CLINIC | Facility: CLINIC | Age: 51
End: 2021-08-30

## 2021-08-30 NOTE — TELEPHONE ENCOUNTER
LM on VM for return call  Patient will need follow up with Dr Amelia Flower for her clavicle fx when call returned

## 2023-02-10 NOTE — ED PROVIDER NOTES
25 year old  presents for LEEP.  She had a Pap on 22 that revealed ASC-H and coloposcopic directed biopsies on 23 by Dr. Neff that revealed MURIEL 2.   LEEP was recommended by Dr. Neff.  Discussed possible risks including cervical incompetence, infection, bleeding, discomfort, and possible incomplete excision of the abnormal tissue so close follow up was necessary. Consent was obtained and all questions were answered.    Today she has no complaints.     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  She agreed to proceed.  She was placed in the dorsal lithotomy position and a coated speculum inserted into the vagina.  The cervix was exposed.  A preliminary colposcopy exam was performed using a dilute solution of acetic acid, Lugol's solution was placed on the cervix.  The cervix was injected with a solution of 1% Lidocaine with 1:200,000 epinephrine.  Following this, a LEEP of the cervix was carried out in 1 pass.  A post leep ECC was performed.  The bed of the LEEP was treated with electrocautery and Monsel's solution.  The instruments were removed.     The patient tolerated the procedurewell     25 year old  with MURIEL 2 .Will contact patient with pathology report and follow up plan.   Post-LEEP instructions were given to the patient.  She was told to expect heavy discharge for the first 4-7 days and could continue for 2 weeks. Nothing in the vagina and no intercourse for 4-6 weeks.  No heavy lifting for next few days.  Return for any signs of infection or heavy bleeding.    Michael Randall MD       History  Chief Complaint   Patient presents with    Alcohol Intoxication     pt presents to ER after drinking all day with her sister, sister called 46 because she was having a hard time getting around and walking  pt reported chest pain as well     Patient brought in by ambulance for chest pressure tight constant for several months  Patient drinks alcohol pint of vodka several times a week  She has gone to rehab before  No hx of SI/ HI  No hx of withdrawal seizures  Patient did not take anything for the chest tightness  She admits to depression and anxiety  She was recently at primary care for difficulty ambulating  Prior to Admission Medications   Prescriptions Last Dose Informant Patient Reported? Taking? FOLIC ACID PO  Self Yes No   Sig: Take by mouth   hydrOXYzine HCL (ATARAX) 25 mg tablet Not Taking at Unknown time  No No   Si/2-1 tab 3 times a day as needed for anxiety and 1-2 at bedtime   Patient not taking: Reported on 10/15/2019   lisinopril (ZESTRIL) 10 mg tablet   No No   Sig: Take 1 tablet (10 mg total) by mouth daily      Facility-Administered Medications: None       Past Medical History:   Diagnosis Date    Hypertension        Past Surgical History:   Procedure Laterality Date    TONSILLECTOMY         History reviewed  No pertinent family history  I have reviewed and agree with the history as documented  Social History     Tobacco Use    Smoking status: Current Every Day Smoker     Packs/day: 1 00     Types: Cigarettes    Smokeless tobacco: Never Used   Substance Use Topics    Alcohol use: Yes     Comment: bottle of vodka a day    Drug use: No        Review of Systems   All other systems reviewed and are negative  Physical Exam  Physical Exam   Constitutional: She is oriented to person, place, and time  She appears well-developed and well-nourished  HENT:   Mouth/Throat: Oropharynx is clear and moist    Eyes: Pupils are equal, round, and reactive to light  Conjunctivae and EOM are normal    Neck: Normal range of motion  Neck supple  No spinous process tenderness present  Cardiovascular: Normal rate, regular rhythm, normal heart sounds and intact distal pulses  Pulmonary/Chest: Effort normal and breath sounds normal  No respiratory distress  She has no wheezes  She exhibits no tenderness  Abdominal: Soft  Bowel sounds are normal  She exhibits no distension  There is no tenderness  Musculoskeletal: Normal range of motion  Neurological: She is alert and oriented to person, place, and time  She has normal strength  She displays no tremor  No cranial nerve deficit or sensory deficit  She displays a negative Romberg sign  Gait abnormal  GCS eye subscore is 4  GCS verbal subscore is 5  GCS motor subscore is 6  Skin: Skin is warm and dry  No rash noted  Psychiatric: She has a normal mood and affect  Her speech is slurred  She expresses no homicidal and no suicidal ideation  Nursing note and vitals reviewed        Vital Signs  ED Triage Vitals   Temperature Pulse Respirations Blood Pressure SpO2   10/15/19 1311 10/15/19 1311 10/15/19 1311 10/15/19 1314 10/15/19 1311   98 °F (36 7 °C) 75 19 162/89 98 %      Temp src Heart Rate Source Patient Position - Orthostatic VS BP Location FiO2 (%)   -- 10/15/19 1311 10/15/19 1311 10/15/19 1311 --    Monitor Lying Left arm       Pain Score       10/15/19 1311       5           Vitals:    10/15/19 1730 10/15/19 1745 10/15/19 1800 10/15/19 2003   BP: 131/74 128/77  133/76   Pulse: 71 74 100 88   Patient Position - Orthostatic VS:    Sitting         Visual Acuity      ED Medications  Medications   thiamine (VITAMIN B1) tablet 100 mg (100 mg Oral Given 78/09/33 4928)   folic acid (FOLVITE) tablet 1 mg (1 mg Oral Given 10/15/19 1347)   sodium chloride 0 9 % bolus 1,000 mL (0 mL Intravenous Stopped 10/15/19 1425)   potassium chloride (K-DUR,KLOR-CON) CR tablet 20 mEq (20 mEq Oral Given 10/15/19 1439)   potassium chloride 20 mEq IVPB (premix) (0 mEq Intravenous Stopped 10/15/19 1631)   potassium chloride (K-DUR,KLOR-CON) CR tablet 20 mEq (20 mEq Oral Given 10/15/19 1439)       Diagnostic Studies  Results Reviewed     Procedure Component Value Units Date/Time    Calcium, ionized [[id number]]  (Abnormal) Collected:  10/15/19 1437    Lab Status:  Final result Specimen:  Blood from Arm, Left Updated:  10/15/19 2015     Calcium, Ionized 0 92 mmol/L     Troponin I [[id number]]  (Normal) Collected:  10/15/19 1658    Lab Status:  Final result Specimen:  Blood from Hand, Left Updated:  10/15/19 1746     Troponin I <0 02 ng/mL     Basic metabolic panel [[id number]]  (Abnormal) Collected:  10/15/19 1658    Lab Status:  Final result Specimen:  Blood from Hand, Left Updated:  10/15/19 1737     Sodium 140 mmol/L      Potassium 3 2 mmol/L      Chloride 100 mmol/L      CO2 24 mmol/L      ANION GAP 16 mmol/L      BUN 8 mg/dL      Creatinine 0 55 mg/dL      Glucose 126 mg/dL      Calcium 7 4 mg/dL      eGFR 110 ml/min/1 73sq m     Narrative:       [place] guidelines for Chronic Kidney Disease (CKD):     Stage 1 with normal or high GFR (GFR > 90 mL/min/1 73 square meters)    Stage 2 Mild CKD (GFR = 60-89 mL/min/1 73 square meters)    Stage 3A Moderate CKD (GFR = 45-59 mL/min/1 73 square meters)    Stage 3B Moderate CKD (GFR = 30-44 mL/min/1 73 square meters)    Stage 4 Severe CKD (GFR = 15-29 mL/min/1 73 square meters)    Stage 5 End Stage CKD (GFR <15 mL/min/1 73 square meters)  Note: GFR calculation is accurate only with a steady state creatinine    Rapid drug screen, urine [[id number]]  (Normal) Collected:  10/15/19 1444    Lab Status:  Final result Specimen:  Urine, Clean Catch Updated:  10/15/19 1501     Amph/Meth UR Negative     Barbiturate Ur Negative     Benzodiazepine Urine Negative     Cocaine Urine Negative     Methadone Urine Negative     Opiate Urine Negative     PCP Ur Negative     THC Urine Negative    Narrative: FOR MEDICAL PURPOSES ONLY  IF CONFIRMATION NEEDED PLEASE CONTACT THE LAB WITHIN 5 DAYS      Drug Screen Cutoff Levels:  AMPHETAMINE/METHAMPHETAMINES  1000 ng/mL  BARBITURATES     200 ng/mL  BENZODIAZEPINES     200 ng/mL  COCAINE      300 ng/mL  METHADONE      300 ng/mL  OPIATES      300 ng/mL  PHENCYCLIDINE     25 ng/mL  THC       50 ng/mL      Ethanol [[id number]]  (Abnormal) Collected:  10/15/19 1334    Lab Status:  Final result Specimen:  Blood from Arm, Right Updated:  10/15/19 1423     Ethanol Lvl 461 mg/dL     Troponin I [[id number]]  (Normal) Collected:  10/15/19 1334    Lab Status:  Final result Specimen:  Blood from Arm, Right Updated:  10/15/19 1418     Troponin I <0 02 ng/mL     Magnesium [[id number]]  (Normal) Collected:  10/15/19 1334    Lab Status:  Final result Specimen:  Blood from Arm, Right Updated:  10/15/19 1416     Magnesium 1 9 mg/dL     Comprehensive metabolic panel [[id number]]  (Abnormal) Collected:  10/15/19 1334    Lab Status:  Final result Specimen:  Blood from Arm, Right Updated:  10/15/19 1416     Sodium 144 mmol/L      Potassium 3 0 mmol/L      Chloride 98 mmol/L      CO2 26 mmol/L      ANION GAP 20 mmol/L      BUN 10 mg/dL      Creatinine 0 62 mg/dL      Glucose 73 mg/dL      Calcium 7 6 mg/dL       U/L      ALT 89 U/L      Alkaline Phosphatase 203 U/L      Total Protein 6 9 g/dL      Albumin 3 3 g/dL      Total Bilirubin 1 00 mg/dL      eGFR 106 ml/min/1 73sq m     Narrative:       [place] guidelines for Chronic Kidney Disease (CKD):     Stage 1 with normal or high GFR (GFR > 90 mL/min/1 73 square meters)    Stage 2 Mild CKD (GFR = 60-89 mL/min/1 73 square meters)    Stage 3A Moderate CKD (GFR = 45-59 mL/min/1 73 square meters)    Stage 3B Moderate CKD (GFR = 30-44 mL/min/1 73 square meters)    Stage 4 Severe CKD (GFR = 15-29 mL/min/1 73 square meters)    Stage 5 End Stage CKD (GFR <15 mL/min/1 73 square meters)  Note: GFR calculation is accurate only with a steady state creatinine    CBC and differential [[id number]] Collected:  10/15/19 1334    Lab Status:  Final result Specimen:  Blood from Arm, Right Updated:  10/15/19 1355     WBC 5 12 Thousand/uL      RBC 3 95 Million/uL      Hemoglobin 12 9 g/dL      Hematocrit 38 2 %      MCV 97 fL      MCH 32 7 pg      MCHC 33 8 g/dL      RDW 14 5 %      MPV 11 1 fL      Platelets 867 Thousands/uL      nRBC 0 /100 WBCs      Neutrophils Relative 54 %      Immat GRANS % 1 %      Lymphocytes Relative 31 %      Monocytes Relative 12 %      Eosinophils Relative 1 %      Basophils Relative 1 %      Neutrophils Absolute 2 75 Thousands/µL      Immature Grans Absolute 0 06 Thousand/uL      Lymphocytes Absolute 1 61 Thousands/µL      Monocytes Absolute 0 62 Thousand/µL      Eosinophils Absolute 0 03 Thousand/µL      Basophils Absolute 0 05 Thousands/µL                  X-ray chest 1 view portable   ED Interpretation by Anuj Lam DO (10/15 2600)   No acute abnl      Final Result by Scar Elizabeth MD (10/15 8817)      No acute cardiopulmonary disease              Workstation performed: [id number]                    Procedures  ECG 12 Lead Documentation Only  Date/Time: 10/15/2019 2:24 PM  Performed by: Anuj Lam DO  Authorized by: Anuj Lam DO     Indications / Diagnosis:  Chest pain  ECG reviewed by me, the ED Provider: yes    Patient location:  ED  Previous ECG:     Previous ECG:  Unavailable  Interpretation:     Interpretation: non-specific    Rate:     ECG rate:  69    ECG rate assessment: normal    Rhythm:     Rhythm: sinus rhythm    Ectopy:     Ectopy: none    QRS:     QRS axis:  Normal    QRS intervals:  Normal  Conduction:     Conduction: normal    ST segments:     ST segments:  Normal  T waves:     T waves: normal    Other findings:     Other findings: prolonged qTc interval             ED Course  ED Course as of Oct 16 0711   Tue Oct 15, 2019   8076 Patient states chest discomfort has been getting better - it actually feels like when her large dog sits on her chest       0602 Medically cleared - consulted [address] Signed out to [address]  [facility] trying to get placement for detox              HEART Risk Score      Most Recent Value   History  0 Filed at: 10/15/2019 1332   ECG  0 Filed at: 10/15/2019 1332   Age  1 Filed at: 10/15/2019 1332   Risk Factors  2 Filed at: 10/15/2019 1332   Troponin  0 Filed at: 10/15/2019 1332   Heart Score Risk Calculator   History  0 Filed at: 10/15/2019 1332   ECG  0 Filed at: 10/15/2019 1332   Age  1 Filed at: 10/15/2019 1332   Risk Factors  2 Filed at: 10/15/2019 1332   Troponin  0 Filed at: 10/15/2019 1332   HEART Score  3 Filed at: 10/15/2019 1332   HEART Score  3 Filed at: 10/15/2019 1332                            MDM  Number of Diagnoses or Management Options  Acute hypokalemia: new and requires workup  Alcohol abuse: new and requires workup  Alcohol intoxication ([address] ): new and requires workup  Atypical chest pain: new and requires workup     Amount and/or Complexity of Data Reviewed  Clinical lab tests: ordered and reviewed  Obtain history from someone other than the patient: yes  Discuss the patient with other providers: yes    Patient Progress  Patient progress: improved      Disposition  Final diagnoses:   Alcohol intoxication ([address] )   Alcohol abuse   Acute hypokalemia   Atypical chest pain     Time reflects when diagnosis was documented in both MDM as applicable and the Disposition within this note     Time User Action Codes Description Comment    10/15/2019  7:28 PM Jerrica Sommer Add [F10 929] Alcohol intoxication ([address] )     10/15/2019  7:28 PM Jerrica Sommer Add [F10 10] Alcohol abuse     10/15/2019  7:28 PM Jerrica Sommer Add [E87 6] Acute hypokalemia     10/15/2019  7:28 PM Jerrica Sommer Add [R07 89] Atypical chest pain       ED Disposition     ED Disposition Condition Date/Time Comment    Discharge Stable Tue Oct 15, 2019 10:56 PM Ninoska Spear discharge to home/self care             Follow-up Information     Follow up With Specialties Details Why Contact Info Additional Information    George Boggs DO Family Medicine  As needed [place] [phone]       [address] [facility] Emergency Medicine  If symptoms worsen [facility]  [address] [facility] [address], [place] [address], [place], [place], [address]    [address]  [facility] [address]  [phone]    Go tomorrow morning  You have a bed available there for alcohol detox  Discharge Medication List as of 10/15/2019 11:04 PM      CONTINUE these medications which have NOT CHANGED    Details   FOLIC ACID PO Take by mouth, Historical Med      hydrOXYzine HCL (ATARAX) 25 mg tablet 1/2-1 tab 3 times a day as needed for anxiety and 1-2 at bedtime, Normal      lisinopril (ZESTRIL) 10 mg tablet Take 1 tablet (10 mg total) by mouth daily, Starting Tue 10/8/2019, Normal           No discharge procedures on file      ED Provider  Electronically Signed by           India Souza DO  10/16/19 7886